# Patient Record
Sex: FEMALE | HISPANIC OR LATINO | URBAN - METROPOLITAN AREA
[De-identification: names, ages, dates, MRNs, and addresses within clinical notes are randomized per-mention and may not be internally consistent; named-entity substitution may affect disease eponyms.]

---

## 2017-01-18 PROBLEM — N39.0 FREQUENT UTI: Status: ACTIVE | Noted: 2017-01-18

## 2017-02-03 ENCOUNTER — APPOINTMENT (RX ONLY)
Dept: URBAN - METROPOLITAN AREA CLINIC 349 | Facility: CLINIC | Age: 42
Setting detail: DERMATOLOGY
End: 2017-02-03

## 2017-02-03 DIAGNOSIS — L29.89 OTHER PRURITUS: ICD-10-CM

## 2017-02-03 DIAGNOSIS — L28.0 LICHEN SIMPLEX CHRONICUS: ICD-10-CM

## 2017-02-03 DIAGNOSIS — L29.8 OTHER PRURITUS: ICD-10-CM

## 2017-02-03 PROBLEM — L20.84 INTRINSIC (ALLERGIC) ECZEMA: Status: ACTIVE | Noted: 2017-02-03

## 2017-02-03 PROBLEM — L70.0 ACNE VULGARIS: Status: ACTIVE | Noted: 2017-02-03

## 2017-02-03 PROCEDURE — 99242 OFF/OP CONSLTJ NEW/EST SF 20: CPT | Mod: 25

## 2017-02-03 PROCEDURE — ? PRESCRIPTION

## 2017-02-03 PROCEDURE — ? COUNSELING

## 2017-02-03 PROCEDURE — 11900 INJECT SKIN LESIONS </W 7: CPT

## 2017-02-03 PROCEDURE — ? INTRALESIONAL KENALOG

## 2017-02-03 RX ORDER — CLOBETASOL PROPIONATE 0.5 MG/G
OINTMENT TOPICAL
Qty: 1 | Refills: 2 | Status: ERX | COMMUNITY
Start: 2017-02-03

## 2017-02-03 RX ADMIN — CLOBETASOL PROPIONATE: 0.5 OINTMENT TOPICAL at 00:00

## 2017-02-03 ASSESSMENT — LOCATION SIMPLE DESCRIPTION DERM: LOCATION SIMPLE: POSTERIOR NECK

## 2017-02-03 ASSESSMENT — LOCATION ZONE DERM: LOCATION ZONE: NECK

## 2017-02-03 ASSESSMENT — SEVERITY ASSESSMENT: SEVERITY: MODERATE

## 2017-02-03 ASSESSMENT — LOCATION DETAILED DESCRIPTION DERM: LOCATION DETAILED: LEFT INFERIOR POSTERIOR NECK

## 2017-02-03 NOTE — PROCEDURE: INTRALESIONAL KENALOG
Kenalog Preparation: Kenalog
Expiration Date (Optional): 04/2018
Concentration Of Solution Injected (Mg/Ml): 10.0
Total Volume Injected (Ccs- Only Use Numbers And Decimals): 0.5
Detail Level: Detailed
Size Of Lesion (Optional): -
Administered By (Optional): Dr. Sarah Mora
Consent: The risks of atrophy were reviewed with the patient.
X Size Of Lesion In Cm (Optional): 0

## 2017-03-22 ENCOUNTER — HOSPITAL ENCOUNTER (OUTPATIENT)
Dept: PHYSICAL THERAPY | Age: 42
Discharge: HOME | End: 2017-03-22
Attending: NURSE PRACTITIONER
Payer: COMMERCIAL

## 2017-03-22 DIAGNOSIS — R32 URINARY INCONTINENCE, UNSPECIFIED TYPE: ICD-10-CM

## 2017-03-22 PROCEDURE — 97162 PT EVAL MOD COMPLEX 30 MIN: CPT

## 2017-03-22 NOTE — PROGRESS NOTES
Bolivar Proctor  : 1975 [facility] at [address], [address] [place] [address],[address], [facility] [address]  [phone]   [phone]          OUTPATIENT PHYSICAL THERAPY:Initial Assessment 3/22/2017    ICD-10: Treatment Diagnosis: Stress Incontinence (N39.3), Myalgia (M79.1)  Precautions/Allergies:   Review of patient's allergies indicates no known allergies. Fall Risk Score: 0 (? 5 = High Risk)  MD Orders: Evaluate and treat MEDICAL/REFERRING DIAGNOSIS:  Urinary incontinence, unspecified type [R32]   DATE OF ONSET: 1 year  REFERRING PHYSICIAN: Liu Rodrigez, Keith Nails, *  RETURN PHYSICIAN APPOINTMENT: TBD     INITIAL ASSESSMENT:  Ms. Franko Agudelo presents with tenderness and increased tone of PFmm. Maintaining this guarded, hypertonic position is likely contributing to her constant pain as well as chronic constipation. Pt demonstrates strength and significant endurance deficits leading to stress incontinence. This might be partly due to the active insufficiency of her PFmm. Pt will benefit from physical therapy to address stated problems. Plan of care was discussed and agreed upon with patient and HEP was initiated. Thank you for the opportunity to work with this patient. PROBLEM LIST (Impacting functional limitations):  1. Decreased Strength  2. Increased Pain  3. Decreased Flexibility/Joint Mobility  4. Decreased strength of pelvic floor which limits bladder control INTERVENTIONS PLANNED:  1. Biofeedback as needed  2. Bladder retraining  3. Bladder education  4. Cold  5. Electrical Stimulation  6. Heat  7. Home Exercise Program (HEP)  8. Manual Therapy  9. Neuromuscular Re-education/Strengthening  10. Therapeutic Activites  11. Therapeutic Exercise/Strengthening  12. Ultrasound (US)   TREATMENT PLAN:  Effective Dates: 3/22/17 TO 17.   Frequency/Duration: 1 time a week for 12 weeks  GOALS: (Goals have been discussed and agreed upon with patient.)  Short-Term Functional Goals: Time Frame: 2 weeks  1. Patient will demonstrate independence with home exercise program.  2. Pt will report increased water intake to 8 glasses/day for improved hydration and bladder health. 3. Pt will report adhering to regular schedule of going to bathroom to urinate Q2hrs for bladder retraining and proper sensory feedback   Discharge Goals: Time Frame: 12 weeks  1. Pt will score 17 on PFIQ-7 for overall functional improvement. 2. Pt will report improvement in pain levels to avg 1/10 to allow for sleeping throughout the night without waking secondary to pain. 3. Pt will report improvement in stress incontinence and 50% less leaks for decreased social anxiety at work. Rehabilitation Potential For Stated Goals: [address] [facility] therapy, I certify that the treatment plan above will be carried out by a therapist or under their direction. Thank you for this referral,  Kamilla Michaud, PT     Referring Physician Signature: Liu Rodrigez, Keith Malave, *              Date                    The information in this section was collected on 03/22/17  (except where otherwise noted). HISTORY:   Present Symptoms:  Pain Intensity 1: 3   History of Present Injury/Illness (Reason for Referral):  Pt reports leaking for years worsening over past year. Reports symptoms of stress incontinence. Reports intermittent pain burning in area of vagina. Pain is constant. avg 3/10 increases to 7/10 at worst.  LBP more with sitting or resting at the end of the day L>R. Pt also reports chronic constipation avg Q3d. Past Medical History/Comorbidities:   Ms. Franko Agudelo  has a past medical history of UTI (urinary tract infection). Ms. Franko Agudelo  has a past surgical history that includes tubal ligation (1996).   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 , Disp: 90 Cap, Rfl: 3    Mth-Me Blue-Sod Phos-PhSal-Hyo (URIBEL) 118-10-40.8-36 mg cap capsule, Take 1 Cap by mouth four (4) times daily. , Disp: 40 Cap, Rfl: 2    silver sulfADIAZINE (SILVADENE) 1 % topical cream, Apply  to affected area daily. , Disp: 50 g, Rfl: 1   Date Last Reviewed:  03/22/17   Gynecological History:   · Number of pregnancies: 2, vaginal 2, C-sections   · Episiotomy: yes with 1st  Past Urinary Medical History:    · History of UTI, Menopause: test positive Q3 mos but have symptoms often; irregular period and periodic hot flashes  · Previous Treatments: none noted  Incontinence History:  PROBLEM: YES/NO: COMMENTS:   Loss of urine with coughing YES    Loss of urine with lifting  YES    Loss of urine with exercise, running NO    Loss of urine with strong urge NO    Loss of urine with approaching the bathroom NO    Loss of urine with key in lock NO    Loss of urine as getting to toilet/removing clothing NO    Loss of urine when hearing running water NO    Have difficulty initiating a urine stream YES    Have difficulty stopping urine stream YES sometimes   Have to strain to empty bladder YES    Dribble urine when urinating YES    Dribble urine after emptying bladder YES    Experience pain with urination YES    Experience burning during urination YES    Have blood in urine NO      Voiding Patterns:  Patient voids 2x/during the day and 0 times during the night. Patient reports that she empties bladder fully rarely. Patient uses pads for bladder protection; she changes pads 1 times per day. Medium saturation  Fluid Intake:  Patient drinks 6 cups of fluid per day. She consumes 2 cups of caffeinated beverages per day. Patient does not limit fluid intake to control bladder.   Bowel Habits:  Patient reports chronic constipation avg Q3d  Mobility / Self Care: independent  Personal / Social History:  · Sexually active? YES:   · Social activities restricted due to urinary incontinence? NO:       Number of Personal Factors/Comorbidities that affect the Plan of Care: 1-2: MODERATE COMPLEXITY   EXAMINATION:   External Observation:   · Voluntary Contraction: present  · Voluntary Relaxation: present  · Involuntary Contraction: absent  · Involuntary Relaxation: absent  · Perineal Body Assessment: WNL  · Anal [place]: not present  · Skin Integrity: WNL  · Q-tip Test: mild tenderness left  · Vaginal Vault Size: within normal limits  · Weak abdominals noted 3+/5 grossly  · Increased thoracic kyphosis and lumbar lordosis in standing    [place] PERFECT (Power/Endurnace/Repetitions/Fast Twitch/Elevation/Co-contraction/Timing) Scale:   · [place] PERFECT = 3/3/5/8//  · Tissue support test with bearing down:  Grade 1: not visible at introitus; Grade 2: visible at introitus; Grade 3: tissue outside introitus  · Anterior Wall = TBD   · Posterior Wall = TBD   · Apical = TBD   · Palpation:   Right Left   Bulbocavernosus  tender   Ischocavernosus  tender   Superficial Transverse Perineal     Sphincter Urethrae     Compressor Urethra      Urethra-vaginalis     Deep Transverse Perineium     Obturator Internus tender Tender with increased tone   Iliococcygeus  tender   Coccygeus  tender   Pubococcygeus  tender   Levator Ani     Adductor     Psoas tender tender         Body Structures Involved:  1. Digestive Structures  2. Joints  3. Muscles  4. Ligaments Body Functions Affected:  1. Mental  2. Sensory/Pain  3. Genitourinary  4. Reproductive  5. Neuromusculoskeletal  6. Movement Related  7. Digestive Activities and Participation Affected:  1. Learning and Applying Knowledge  2. General Tasks and Demands  3. Mobility  4. Self Care  5. Domestic Life  6. Interpersonal Interactions and Relationships  7.  Community, Social and [place]   Number of elements that affect the Plan of Care: 3: MODERATE COMPLEXITY   CLINICAL PRESENTATION:   Presentation: Evolving clinical presentation with changing clinical characteristics: MODERATE COMPLEXITY   CLINICAL DECISION MAKING:   Outcome Measure: Tool Used: Pelvic Floor Impact Questionnaire--short form 7 (PFIQ-7)   Score:  Initial: 22.2%  · Bladder or Urine: 23.81  · Bowel or Rectum: 28.57  · Vagina or Pelvis: 14.29 Most Recent: X (Date: -- )  · Bladder or Urine: X  · Bowel or Rectum: X  · Vagina or Pelvis: X   Interpretation of Score: Each of the 7 sections is scored on a scale from 0-3; 0 representing \"Not at all\", 3 representing \"Quite a bit\". The mean value is taken from all the answered items, then multiplied by 100 to obtain the scale score, ranging from 0-100. This process is repeated for each column representing bowel, bladder, and pelvic pain. ? Self Care:     - CURRENT STATUS: CJ - 20%-39% impaired, limited or restricted    - GOAL STATUS: CI - 1%-19% impaired, limited or restricted    - D/C STATUS:  ---------------To be determined---------------     Medical Necessity:   · Patient demonstrates good rehab potential due to higher previous functional level. Reason for Services/Other Comments:  · Patient continues to require skilled intervention due to ongoing goals noted above. Use of outcome tool(s) and clinical judgement create a POC that gives a: Questionable prediction of patient's progress: MODERATE COMPLEXITY   TREATMENT:   (In addition to Assessment/Re-Assessment sessions the following treatments were rendered)  Pre-treatment Symptoms/Complaints:  States she is experiencing constant discomfort in perineum area; BM avg Q3d. 2ppd  Pain: Initial:   Pain Intensity 1: 3  Post Session:  2     THERAPEUTIC EXERCISE: (  minutes):  Exercises per grid below to improve strength and coordination. Required minimal verbal and tactile cues to promote proper body mechanics and promote proper body breathing techniques.   Progressed resistance and repetitions as indicated. Date:  3/22/17 Date:   Date:     Activity/Exercise Parameters Parameters Parameters   DKC  [id number]     Happy baby stretch [id number]                                        Exercises:  Patient instructed in pelvic floor exercises listed below:  HEP: Dave Galla, happy baby stretch  The following educational topics were reviewed with patient:  Bladder health, tips to control urge, bladder diary, pelvic floor anatomy, how foods affect bladder, bladder retraining. Treatment/Session Assessment:    · Response to Treatment:  Pt reported good understanding of plan of care as well as exercises. All questions were answered and pt was invited to call with any further questions or issues   · Compliance with Program/Exercises: Will assess as treatment progresses. · Recommendations/Intent for next treatment session: \"Next visit will focus on advancements to more challenging activities\".   Total Treatment Duration:  PT Patient Time In/Time Out  Time In: 1330  Time Out: [address] [place]

## 2017-03-22 NOTE — PROGRESS NOTES
Ambulatory/Rehab Services H2 Model Falls Risk Assessment    Risk Factor Pts. ·   Confusion/Disorientation/Impulsivity  []    4 ·   Symptomatic Depression  []   2 ·   Altered Elimination  []   1 ·   Dizziness/Vertigo  []   1 ·   Gender (Male)  []   1 ·   Any administered antiepileptics (anticonvulsants):  []   2 ·   Any administered benzodiazepines:  []   1 ·   Visual Impairment (specify):  []   1 ·   Portable Oxygen Use  []   1 ·   Orthostatic ? BP  []   1 ·   History of Recent Falls (within 3 mos.)  []   5     Ability to Rise from Chair (choose one) Pts. ·   Ability to rise in a single movement  [x]   0 ·   Pushes up, successful in one attempt  []   1 ·   Multiple attempts, but successful  []   3 ·   Unable to rise without assistance  []   4   Total: (5 or greater = High Risk) 0     Falls Prevention Plan:   []                Physical Limitations to Exercise (specify):   []                Mobility Assistance Device (type):   []                Exercise/Equipment Adaptation (specify):    ©2010 [facility] [address] [place] [address] Patent #[id number].  Federal Law prohibits the replication, distribution or use without written permission from [facility]

## 2017-03-28 ENCOUNTER — HOSPITAL ENCOUNTER (OUTPATIENT)
Dept: PHYSICAL THERAPY | Age: 42
Discharge: HOME | End: 2017-03-28
Attending: NURSE PRACTITIONER
Payer: COMMERCIAL

## 2017-03-28 PROCEDURE — 97140 MANUAL THERAPY 1/> REGIONS: CPT

## 2017-03-28 PROCEDURE — 97110 THERAPEUTIC EXERCISES: CPT

## 2017-03-28 NOTE — PROGRESS NOTES
April Nevarez  : 1975 [facility] at [address], [address] [place] [address],[address], [facility] [address].  [phone]   [phone]          OUTPATIENT PHYSICAL THERAPY:Daily Note 3/28/2017    ICD-10: Treatment Diagnosis: Stress Incontinence (N39.3), Myalgia (M79.1)  Precautions/Allergies:   Review of patient's allergies indicates no known allergies. Fall Risk Score: 0 (? 5 = High Risk)  MD Orders: Evaluate and treat MEDICAL/REFERRING DIAGNOSIS:  Unspecified urinary incontinence [R32]   DATE OF ONSET: 1 year  REFERRING PHYSICIAN: Maxi Sheldon, Glenis Noble, *  RETURN PHYSICIAN APPOINTMENT: TBD     INITIAL ASSESSMENT:  Ms. Carolina Garnett presents with tenderness and increased tone of PFmm. Maintaining this guarded, hypertonic position is likely contributing to her constant pain as well as chronic constipation. Pt demonstrates strength and significant endurance deficits leading to stress incontinence. This might be partly due to the active insufficiency of her PFmm. Pt will benefit from physical therapy to address stated problems. Plan of care was discussed and agreed upon with patient and HEP was initiated. Thank you for the opportunity to work with this patient. PROBLEM LIST (Impacting functional limitations):  1. Decreased Strength  2. Increased Pain  3. Decreased Flexibility/Joint Mobility  4. Decreased strength of pelvic floor which limits bladder control INTERVENTIONS PLANNED:  1. Biofeedback as needed  2. Bladder retraining  3. Bladder education  4. Cold  5. Electrical Stimulation  6. Heat  7. Home Exercise Program (HEP)  8. Manual Therapy  9. Neuromuscular Re-education/Strengthening  10. Therapeutic Activites  11. Therapeutic Exercise/Strengthening  12. Ultrasound (US)   TREATMENT PLAN:  Effective Dates: 3/22/17 TO 17.   Frequency/Duration: 1 time a week for 12 weeks  GOALS: (Goals have been discussed and agreed upon with patient.)  Short-Term Functional Goals: Time Frame: 2 weeks  1. Patient will demonstrate independence with home exercise program.  2. Pt will report increased water intake to 8 glasses/day for improved hydration and bladder health. 3. Pt will report adhering to regular schedule of going to bathroom to urinate Q2hrs for bladder retraining and proper sensory feedback   Discharge Goals: Time Frame: 12 weeks  1. Pt will score 17 on PFIQ-7 for overall functional improvement. 2. Pt will report improvement in pain levels to avg 1/10 to allow for sleeping throughout the night without waking secondary to pain. 3. Pt will report improvement in stress incontinence and 50% less leaks for decreased social anxiety at work. Rehabilitation Potential For Stated Goals: [address] [facility] therapy, I certify that the treatment plan above will be carried out by a therapist or under their direction. Thank you for this referral,  Aleshia Mansfield, PT     Referring Physician Signature: Adan Clark, Jenna Jarvis, *              Date                    The information in this section was collected on 03/28/17  (except where otherwise noted). HISTORY:   Present Symptoms:  Pain Intensity 1: (P) 3   History of Present Injury/Illness (Reason for Referral):  Pt reports leaking for years worsening over past year. Reports symptoms of stress incontinence. Reports intermittent pain burning in area of vagina. Pain is constant. avg 3/10 increases to 7/10 at worst.  LBP more with sitting or resting at the end of the day L>R. Pt also reports chronic constipation avg Q3d. Past Medical History/Comorbidities:   Ms. Carmen Ferrell  has a past medical history of UTI (urinary tract infection). Ms. Carmen Ferrell  has a past surgical history that includes tubal ligation (1996).   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 , Disp: 90 Cap, Rfl: 3    Mth-Me Blue-Sod Phos-PhSal-Hyo (URIBEL) 118-10-40.8-36 mg cap capsule, Take 1 Cap by mouth four (4) times daily. , Disp: 40 Cap, Rfl: 2    silver sulfADIAZINE (SILVADENE) 1 % topical cream, Apply  to affected area daily. , Disp: 50 g, Rfl: 1   Date Last Reviewed:  03/28/17   Gynecological History:   · Number of pregnancies: 2, vaginal 2, C-sections   · Episiotomy: yes with 1st  Past Urinary Medical History:    · History of UTI, Menopause: test positive Q3 mos but have symptoms often; irregular period and periodic hot flashes  · Previous Treatments: none noted  Incontinence History:  PROBLEM: YES/NO: COMMENTS:   Loss of urine with coughing YES    Loss of urine with lifting  YES    Loss of urine with exercise, running NO    Loss of urine with strong urge NO    Loss of urine with approaching the bathroom NO    Loss of urine with key in lock NO    Loss of urine as getting to toilet/removing clothing NO    Loss of urine when hearing running water NO    Have difficulty initiating a urine stream YES    Have difficulty stopping urine stream YES sometimes   Have to strain to empty bladder YES    Dribble urine when urinating YES    Dribble urine after emptying bladder YES    Experience pain with urination YES    Experience burning during urination YES    Have blood in urine NO      Voiding Patterns:  Patient voids 2x/during the day and 0 times during the night. Patient reports that she empties bladder fully rarely. Patient uses pads for bladder protection; she changes pads 1 times per day. Medium saturation  Fluid Intake:  Patient drinks 6 cups of fluid per day. She consumes 2 cups of caffeinated beverages per day. Patient does not limit fluid intake to control bladder.   Bowel Habits:  Patient reports chronic constipation avg Q3d  Mobility / Self Care: independent  Personal / Social History:  · Sexually active? YES:   · Social activities restricted due to urinary incontinence? NO:       Number of Personal Factors/Comorbidities that affect the Plan of Care: 1-2: MODERATE COMPLEXITY   EXAMINATION:   External Observation:   · Voluntary Contraction: present  · Voluntary Relaxation: present  · Involuntary Contraction: absent  · Involuntary Relaxation: absent  · Perineal Body Assessment: WNL  · Anal Vernon: not present  · Skin Integrity: WNL  · Q-tip Test: mild tenderness left  · Vaginal Vault Size: within normal limits  · Weak abdominals noted 3+/5 grossly  · Increased thoracic kyphosis and lumbar lordosis in standing    [place] PERFECT (Power/Endurnace/Repetitions/Fast Twitch/Elevation/Co-contraction/Timing) Scale:   · [place] PERFECT = 3/3/5/8//  · Tissue support test with bearing down:  Grade 1: not visible at introitus; Grade 2: visible at introitus; Grade 3: tissue outside introitus  · Anterior Wall = TBD   · Posterior Wall = TBD   · Apical = TBD   · Palpation:   Right Left   Bulbocavernosus  tender   Ischocavernosus  tender   Superficial Transverse Perineal     Sphincter Urethrae     Compressor Urethra      Urethra-vaginalis     Deep Transverse Perineium     Obturator Internus tender Tender with increased tone   Iliococcygeus  tender   Coccygeus  tender   Pubococcygeus  tender   Levator Ani     Adductor     Psoas tender tender      SEMG evaluation:  Date: 3/28/17  Resting Tone: 7uV  Quality of Resting Tone: irregular  5 Second Hold: 20 uV  10 Second Hold: 15 uV  Quality of Recruitment: Good   Quality of Relaxation: Fair   Quality of Holding: Fair   Stability of Hold: Fair   Stability of Rest: Fair          Body Structures Involved:  1. Digestive Structures  2. Joints  3. Muscles  4. Ligaments Body Functions Affected:  1. Mental  2. Sensory/Pain  3. Genitourinary  4. Reproductive  5. Neuromusculoskeletal  6. Movement Related  7.  Digestive Activities and Participation Affected:  1. Learning and Applying Knowledge  2. General Tasks and Demands  3. Mobility  4. Self Care  5. Domestic Life  6. Interpersonal Interactions and Relationships  7. Community, Social and [place]   Number of elements that affect the Plan of Care: 3: MODERATE COMPLEXITY   CLINICAL PRESENTATION:   Presentation: Evolving clinical presentation with changing clinical characteristics: MODERATE COMPLEXITY   CLINICAL DECISION MAKING:   Outcome Measure: Tool Used: Pelvic Floor Impact Questionnaire--short form 7 (PFIQ-7)   Score:  Initial: 22.2%  · Bladder or Urine: 23.81  · Bowel or Rectum: 28.57  · Vagina or Pelvis: 14.29 Most Recent: X (Date: -- )  · Bladder or Urine: X  · Bowel or Rectum: X  · Vagina or Pelvis: X   Interpretation of Score: Each of the 7 sections is scored on a scale from 0-3; 0 representing \"Not at all\", 3 representing \"Quite a bit\". The mean value is taken from all the answered items, then multiplied by 100 to obtain the scale score, ranging from 0-100. This process is repeated for each column representing bowel, bladder, and pelvic pain. ? Self Care:     - CURRENT STATUS: CJ - 20%-39% impaired, limited or restricted    - GOAL STATUS: CI - 1%-19% impaired, limited or restricted    - D/C STATUS:  ---------------To be determined---------------     Medical Necessity:   · Patient demonstrates good rehab potential due to higher previous functional level. Reason for Services/Other Comments:  · Patient continues to require skilled intervention due to ongoing goals noted above. Use of outcome tool(s) and clinical judgement create a POC that gives a: Questionable prediction of patient's progress: MODERATE COMPLEXITY   TREATMENT:   (In addition to Assessment/Re-Assessment sessions the following treatments were rendered)  Pre-treatment Symptoms/Complaints:  Reports 3/10 pain today.   BM yesterday and states she has been having one about Q2d this week (improvement from Q3d last week).  States she is trying to drink more water. Points to pain today at left piriformis traveling FCI down posterior thigh. Pain: Initial:   Pain Intensity 1: (P) 3  Post Session:  2     THERAPEUTIC EXERCISE: ( 30 minutes):  Exercises per grid below to improve strength and coordination. Required minimal verbal and tactile cues to promote proper body mechanics and promote proper body breathing techniques. Progressed resistance and repetitions as indicated. Date:  3/22/17 Date:  3/28/17 Date:     Activity/Exercise Parameters Parameters Parameters   DKC  [id number]    Happy baby stretch [id number]    Piriformis stretch  [id number]    PF drops with biofeedback  10'    Diaphragmatic breathing/relaxation with biofeedback  10'                     Exercises:  Patient instructed in pelvic floor exercises listed below:  HEP: [address], happy baby stretch  3/28/17: Added piriformis stretch, abdominal massage for peristalsis, diaphragmatic breathing for HEP  Manual Therapy: (30'): performed for relaxation of muscles to relieve guarding and pain  SCS and TPR to OI, LA internally, SCS to piriformis externally  The following educational topics were reviewed with patient:  Bladder health, tips to control urge, bladder diary, pelvic floor anatomy, how foods affect bladder, bladder retraining. Treatment/Session Assessment:    · Response to Treatment:  Pt tolerated all treatment well. Voiced understanding of HEP. Discussed getting routine for morning - hot beverage, abdominal massage to trigger gastrocolic reflex. Diaphragmatic breathing for relaxation   · Compliance with Program/Exercises: Will assess as treatment progresses. · Recommendations/Intent for next treatment session: \"Next visit will focus on advancements to more challenging activities\".   Total Treatment Duration:  PT Patient Time In/Time Out  Time In: (P) 1500  Time Out: (P) 239 [employer] Ciera Garcia, PT

## 2017-04-04 ENCOUNTER — HOSPITAL ENCOUNTER (OUTPATIENT)
Dept: PHYSICAL THERAPY | Age: 42
Discharge: HOME | End: 2017-04-04
Attending: NURSE PRACTITIONER
Payer: COMMERCIAL

## 2017-04-04 NOTE — PROGRESS NOTES
[facility] [place] [address]  [address] [place] [address], [address] [place] [address],[address] [place], [address]  Phone: [phone]   Fax: [phone]    Pt cancelled today's physical therapy appointment due to a family emergency. Plan to follow up at next scheduled visit.     Beatriz Robert, MPT

## 2017-04-07 ENCOUNTER — APPOINTMENT (OUTPATIENT)
Dept: PHYSICAL THERAPY | Age: 42
End: 2017-04-07
Attending: NURSE PRACTITIONER
Payer: COMMERCIAL

## 2017-04-18 ENCOUNTER — HOSPITAL ENCOUNTER (OUTPATIENT)
Dept: PHYSICAL THERAPY | Age: 42
Discharge: HOME | End: 2017-04-18
Attending: NURSE PRACTITIONER
Payer: COMMERCIAL

## 2017-04-18 NOTE — PROGRESS NOTES
[facility] [place] [address]  [address] [facility], [address] [place] [address], [address]  Phone: [phone]   Fax: [phone]    Pt cancelled today's physical therapy appointment. Plan to follow up at next scheduled visit.     Denis Savage, [facility]

## 2017-04-21 ENCOUNTER — APPOINTMENT (OUTPATIENT)
Dept: PHYSICAL THERAPY | Age: 42
End: 2017-04-21
Attending: NURSE PRACTITIONER
Payer: COMMERCIAL

## 2017-04-28 ENCOUNTER — APPOINTMENT (OUTPATIENT)
Dept: PHYSICAL THERAPY | Age: 42
End: 2017-04-28
Attending: NURSE PRACTITIONER
Payer: COMMERCIAL

## 2017-04-28 ENCOUNTER — HOSPITAL ENCOUNTER (OUTPATIENT)
Dept: PHYSICAL THERAPY | Age: 42
Discharge: HOME | End: 2017-04-28
Attending: NURSE PRACTITIONER
Payer: COMMERCIAL

## 2017-04-28 PROCEDURE — 97140 MANUAL THERAPY 1/> REGIONS: CPT

## 2017-04-28 PROCEDURE — 97110 THERAPEUTIC EXERCISES: CPT

## 2017-04-28 NOTE — PROGRESS NOTES
Nj Cowan  : 1975 [facility] at [address], [address] [place] [address],[address], [facility].  [phone]   [phone]          OUTPATIENT PHYSICAL THERAPY:Daily Note 2017    ICD-10: Treatment Diagnosis: Stress Incontinence (N39.3), Myalgia (M79.1)  Precautions/Allergies:   Review of patient's allergies indicates no known allergies. Fall Risk Score: 0 (? 5 = High Risk)  MD Orders: Evaluate and treat MEDICAL/REFERRING DIAGNOSIS:  Unspecified urinary incontinence [R32]   DATE OF ONSET: 1 year  REFERRING PHYSICIAN: Ricarda Castaneda, Nikita Le, *  RETURN PHYSICIAN APPOINTMENT: TBD     INITIAL ASSESSMENT:  Ms. Priyanka Desai presents with tenderness and increased tone of PFmm. Maintaining this guarded, hypertonic position is likely contributing to her constant pain as well as chronic constipation. Pt demonstrates strength and significant endurance deficits leading to stress incontinence. This might be partly due to the active insufficiency of her PFmm. Pt will benefit from physical therapy to address stated problems. Plan of care was discussed and agreed upon with patient and HEP was initiated. Thank you for the opportunity to work with this patient. PROBLEM LIST (Impacting functional limitations):  1. Decreased Strength  2. Increased Pain  3. Decreased Flexibility/Joint Mobility  4. Decreased strength of pelvic floor which limits bladder control INTERVENTIONS PLANNED:  1. Biofeedback as needed  2. Bladder retraining  3. Bladder education  4. Cold  5. Electrical Stimulation  6. Heat  7. Home Exercise Program (HEP)  8. Manual Therapy  9. Neuromuscular Re-education/Strengthening  10. Therapeutic Activites  11. Therapeutic Exercise/Strengthening  12. Ultrasound (US)   TREATMENT PLAN:  Effective Dates: 3/22/17 TO 17.   Frequency/Duration: 1 time a week for 12 weeks  GOALS: (Goals have been discussed and agreed upon with patient.)  Short-Term Functional Goals: Time Frame: 2 weeks  1. Patient will demonstrate independence with home exercise program.  2. Pt will report increased water intake to 8 glasses/day for improved hydration and bladder health. 3. Pt will report adhering to regular schedule of going to bathroom to urinate Q2hrs for bladder retraining and proper sensory feedback   Discharge Goals: Time Frame: 12 weeks  1. Pt will score 17 on PFIQ-7 for overall functional improvement. 2. Pt will report improvement in pain levels to avg 1/10 to allow for sleeping throughout the night without waking secondary to pain. 3. Pt will report improvement in stress incontinence and 50% less leaks for decreased social anxiety at work. Rehabilitation Potential For Stated Goals: [address] [facility] therapy, I certify that the treatment plan above will be carried out by a therapist or under their direction. Thank you for this referral,  Sherman Yoon, PT     Referring Physician Signature: Sascha Araujo, *              Date                    The information in this section was collected on 04/28/17  (except where otherwise noted). HISTORY:   Present Symptoms:  Pain Intensity 1: 6   History of Present Injury/Illness (Reason for Referral):  Pt reports leaking for years worsening over past year. Reports symptoms of stress incontinence. Reports intermittent pain burning in area of vagina. Pain is constant. avg 3/10 increases to 7/10 at worst.  LBP more with sitting or resting at the end of the day L>R. Pt also reports chronic constipation avg Q3d. Past Medical History/Comorbidities:   Ms. Ramiro Pace  has a past medical history of UTI (urinary tract infection). Ms. Ramiro Pace  has a past surgical history that includes tubal ligation (1996).   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 , Disp: 90 Cap, Rfl: 3    Mth-Me Blue-Sod Phos-PhSal-Hyo (URIBEL) 118-10-40.8-36 mg cap capsule, Take 1 Cap by mouth four (4) times daily. , Disp: 40 Cap, Rfl: 2    silver sulfADIAZINE (SILVADENE) 1 % topical cream, Apply  to affected area daily. , Disp: 50 g, Rfl: 1   Date Last Reviewed:  04/28/17   Gynecological History:   · Number of pregnancies: 2, vaginal 2, C-sections   · Episiotomy: yes with 1st  Past Urinary Medical History:    · History of UTI, Menopause: test positive Q3 mos but have symptoms often; irregular period and periodic hot flashes  · Previous Treatments: none noted  Incontinence History:  PROBLEM: YES/NO: COMMENTS:   Loss of urine with coughing YES    Loss of urine with lifting  YES    Loss of urine with exercise, running NO    Loss of urine with strong urge NO    Loss of urine with approaching the bathroom NO    Loss of urine with key in lock NO    Loss of urine as getting to toilet/removing clothing NO    Loss of urine when hearing running water NO    Have difficulty initiating a urine stream YES    Have difficulty stopping urine stream YES sometimes   Have to strain to empty bladder YES    Dribble urine when urinating YES    Dribble urine after emptying bladder YES    Experience pain with urination YES    Experience burning during urination YES    Have blood in urine NO      Voiding Patterns:  Patient voids 2x/during the day and 0 times during the night. Patient reports that she empties bladder fully rarely. Patient uses pads for bladder protection; she changes pads 1 times per day. Medium saturation  Fluid Intake:  Patient drinks 6 cups of fluid per day. She consumes 2 cups of caffeinated beverages per day. Patient does not limit fluid intake to control bladder.   Bowel Habits:  Patient reports chronic constipation avg Q3d  Mobility / Self Care: independent  Personal / Social History:  · Sexually active? YES:   · Social activities restricted due to urinary incontinence? NO:       Number of Personal Factors/Comorbidities that affect the Plan of Care: 1-2: MODERATE COMPLEXITY   EXAMINATION:   External Observation:   · Voluntary Contraction: present  · Voluntary Relaxation: present  · Involuntary Contraction: absent  · Involuntary Relaxation: absent  · Perineal Body Assessment: WNL  · Anal [place]: not present  · Skin Integrity: WNL  · Q-tip Test: mild tenderness left  · Vaginal Vault Size: within normal limits  · Weak abdominals noted 3+/5 grossly  · Increased thoracic kyphosis and lumbar lordosis in standing    [place] PERFECT (Power/Endurnace/Repetitions/Fast Twitch/Elevation/Co-contraction/Timing) Scale:   · [place] PERFECT = 3/3/5/8//  · Tissue support test with bearing down:  Grade 1: not visible at introitus; Grade 2: visible at introitus; Grade 3: tissue outside introitus  · Anterior Wall = TBD   · Posterior Wall = TBD   · Apical = TBD   · Palpation:   Right Left   Bulbocavernosus  tender   Ischocavernosus  tender   Superficial Transverse Perineal     Sphincter Urethrae     Compressor Urethra      Urethra-vaginalis     Deep Transverse Perineium     Obturator Internus tender Tender with increased tone   Iliococcygeus  tender   Coccygeus  tender   Pubococcygeus  tender   Levator Ani     Adductor     Psoas tender tender      SEMG evaluation:  Date: 3/28/17  Resting Tone: 7uV  Quality of Resting Tone: irregular  5 Second Hold: 20 uV  10 Second Hold: 15 uV  Quality of Recruitment: Good   Quality of Relaxation: Fair   Quality of Holding: Fair   Stability of Hold: Fair   Stability of Rest: Fair          Body Structures Involved:  1. Digestive Structures  2. Joints  3. Muscles  4. Ligaments Body Functions Affected:  1. Mental  2. Sensory/Pain  3. Genitourinary  4. Reproductive  5. Neuromusculoskeletal  6. Movement Related  7.  Digestive Activities and Participation Affected:  1. Learning and Applying Knowledge  2. General Tasks and Demands  3. Mobility  4. Self Care  5. Domestic Life  6. Interpersonal Interactions and Relationships  7. Community, Social and [place]   Number of elements that affect the Plan of Care: 3: MODERATE COMPLEXITY   CLINICAL PRESENTATION:   Presentation: Evolving clinical presentation with changing clinical characteristics: MODERATE COMPLEXITY   CLINICAL DECISION MAKING:   Outcome Measure: Tool Used: Pelvic Floor Impact Questionnaire--short form 7 (PFIQ-7)   Score:  Initial: 22.2%  · Bladder or Urine: 23.81  · Bowel or Rectum: 28.57  · Vagina or Pelvis: 14.29 Most Recent: X (Date: -- )  · Bladder or Urine: X  · Bowel or Rectum: X  · Vagina or Pelvis: X   Interpretation of Score: Each of the 7 sections is scored on a scale from 0-3; 0 representing \"Not at all\", 3 representing \"Quite a bit\". The mean value is taken from all the answered items, then multiplied by 100 to obtain the scale score, ranging from 0-100. This process is repeated for each column representing bowel, bladder, and pelvic pain. ? Self Care:     - CURRENT STATUS: CJ - 20%-39% impaired, limited or restricted    - GOAL STATUS: CI - 1%-19% impaired, limited or restricted    - D/C STATUS:  ---------------To be determined---------------     Medical Necessity:   · Patient demonstrates good rehab potential due to higher previous functional level. Reason for Services/Other Comments:  · Patient continues to require skilled intervention due to ongoing goals noted above. Use of outcome tool(s) and clinical judgement create a POC that gives a: Questionable prediction of patient's progress: MODERATE COMPLEXITY   TREATMENT:   (In addition to Assessment/Re-Assessment sessions the following treatments were rendered)  Pre-treatment Symptoms/Complaints:    Pt reports having a lot of left hip lately. BM Q 2-3 days. Urinating more and feeling more urge.   Hip pain worse with sitting. Reports radicular symptoms posterior LLE extending to foot and reports as 'burning'. 6/10 pain currently    Pain: Initial:   Pain Intensity 1: 6  Post Session:  2     THERAPEUTIC EXERCISE: ( 30 minutes):  Exercises per grid below to improve strength and coordination. Required minimal verbal and tactile cues to promote proper body mechanics and promote proper body breathing techniques. Progressed resistance and repetitions as indicated. 4/28/17 - included further assessment of left hip pain    Date:  3/22/17 Date:  3/28/17 Date:  4/28/17   Activity/Exercise Parameters Parameters Parameters   DKC  [id number]    Happy baby stretch [id number] 2x 45sec   Piriformis stretch  [id number]   PF drops with biofeedback  10'    Diaphragmatic breathing/relaxation with biofeedback  10'    Prone press ups   2x10            Exercises:  Patient instructed in pelvic floor exercises listed below:  HEP: [address], happy baby stretch  3/28/17: Added piriformis stretch, abdominal massage for peristalsis, diaphragmatic breathing for HEP  Manual Therapy: (30'): performed for relaxation of muscles to relieve guarding and pain  SCS and TPR to OI, LA internally, -held  SCS and STM to piriformis externally  Manual axial distraction  The following educational topics were reviewed with patient:  Bladder health, tips to control urge, bladder diary, pelvic floor anatomy, how foods affect bladder, bladder retraining. Treatment/Session Assessment:    · Response to Treatment:  Pt presents with symptoms consistent with L4-5 disc pathology. Symptoms decreased in intensity as well as centralized with repeated extension in lying. no change or exacerbation in symptoms with resisted left hip abd or ER. Issued prone pressups for home and discussed limiting sitting and no slouching. Stressed importance of posture. Assess prolonged results. · Compliance with Program/Exercises:  Will assess as treatment progresses. · Recommendations/Intent for next treatment session: \"Next visit will focus on advancements to more challenging activities\".   Total Treatment Duration:  PT Patient Time In/Time Out  Time In: 1330  Time Out: [address]

## 2017-05-05 ENCOUNTER — HOSPITAL ENCOUNTER (OUTPATIENT)
Dept: PHYSICAL THERAPY | Age: 42
End: 2017-05-05
Attending: NURSE PRACTITIONER
Payer: COMMERCIAL

## 2017-05-05 ENCOUNTER — APPOINTMENT (OUTPATIENT)
Dept: PHYSICAL THERAPY | Age: 42
End: 2017-05-05
Attending: NURSE PRACTITIONER
Payer: COMMERCIAL

## 2017-05-12 ENCOUNTER — HOSPITAL ENCOUNTER (OUTPATIENT)
Dept: PHYSICAL THERAPY | Age: 42
Discharge: HOME | End: 2017-05-12
Attending: NURSE PRACTITIONER
Payer: COMMERCIAL

## 2017-05-12 ENCOUNTER — APPOINTMENT (OUTPATIENT)
Dept: PHYSICAL THERAPY | Age: 42
End: 2017-05-12
Attending: NURSE PRACTITIONER
Payer: COMMERCIAL

## 2017-05-12 PROCEDURE — 97110 THERAPEUTIC EXERCISES: CPT

## 2017-05-12 PROCEDURE — 97140 MANUAL THERAPY 1/> REGIONS: CPT

## 2017-05-12 NOTE — PROGRESS NOTES
Selma Drake  : 1975 [facility] [address], [address] [place] [address],[address]  [phone]   [phone]          OUTPATIENT PHYSICAL THERAPY:Daily Note 2017    ICD-10: Treatment Diagnosis: Stress Incontinence (N39.3), Myalgia (M79.1)  Precautions/Allergies:   Review of patient's allergies indicates no known allergies. Fall Risk Score: 0 (? 5 = High Risk)  MD Orders: Evaluate and treat MEDICAL/REFERRING DIAGNOSIS:  Unspecified urinary incontinence [R32]   DATE OF ONSET: 1 year  REFERRING PHYSICIAN: Maurilio Contreras, Leslye De Leon, *  RETURN PHYSICIAN APPOINTMENT: TBD     INITIAL ASSESSMENT:  Ms. Dominique Linton presents with tenderness and increased tone of PFmm. Maintaining this guarded, hypertonic position is likely contributing to her constant pain as well as chronic constipation. Pt demonstrates strength and significant endurance deficits leading to stress incontinence. This might be partly due to the active insufficiency of her PFmm. Pt will benefit from physical therapy to address stated problems. Plan of care was discussed and agreed upon with patient and HEP was initiated. Thank you for the opportunity to work with this patient. PROBLEM LIST (Impacting functional limitations):  1. Decreased Strength  2. Increased Pain  3. Decreased Flexibility/Joint Mobility  4. Decreased strength of pelvic floor which limits bladder control INTERVENTIONS PLANNED:  1. Biofeedback as needed  2. Bladder retraining  3. Bladder education  4. Cold  5. Electrical Stimulation  6. Heat  7. Home Exercise Program (HEP)  8. Manual Therapy  9. Neuromuscular Re-education/Strengthening  10. Therapeutic Activites  11. Therapeutic Exercise/Strengthening  12. Ultrasound (US)   TREATMENT PLAN:  Effective Dates: 3/22/17 TO 17.   Frequency/Duration: 1 time a week for 12 weeks  GOALS: (Goals have been discussed and agreed upon with patient.)  Short-Term Functional Goals: Time Frame: 2 weeks  1. Patient will demonstrate independence with home exercise program.  2. Pt will report increased water intake to 8 glasses/day for improved hydration and bladder health. 3. Pt will report adhering to regular schedule of going to bathroom to urinate Q2hrs for bladder retraining and proper sensory feedback   Discharge Goals: Time Frame: 12 weeks  1. Pt will score 17 on PFIQ-7 for overall functional improvement. 2. Pt will report improvement in pain levels to avg 1/10 to allow for sleeping throughout the night without waking secondary to pain. 3. Pt will report improvement in stress incontinence and 50% less leaks for decreased social anxiety at work. Rehabilitation Potential For Stated Goals: [address] [facility] therapy, I certify that the treatment plan above will be carried out by a therapist or under their direction. Thank you for this referral,  Arun Sandhu, PT     Referring Physician Signature: Pop Li, *              Date                    The information in this section was collected on 05/12/17  (except where otherwise noted). HISTORY:   Present Symptoms:  Pain Intensity 1: (P) 2   History of Present Injury/Illness (Reason for Referral):  Pt reports leaking for years worsening over past year. Reports symptoms of stress incontinence. Reports intermittent pain burning in area of vagina. Pain is constant. avg 3/10 increases to 7/10 at worst.  LBP more with sitting or resting at the end of the day L>R. Pt also reports chronic constipation avg Q3d. Past Medical History/Comorbidities:   Ms. Italia Murphy  has a past medical history of UTI (urinary tract infection). Ms. Italia Murphy  has a past surgical history that includes tubal ligation (1996).   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 , Disp: 90 Cap, Rfl: 3    Mth-Me Blue-Sod Phos-PhSal-Hyo (URIBEL) 118-10-40.8-36 mg cap capsule, Take 1 Cap by mouth four (4) times daily. , Disp: 40 Cap, Rfl: 2    silver sulfADIAZINE (SILVADENE) 1 % topical cream, Apply  to affected area daily. , Disp: 50 g, Rfl: 1   Date Last Reviewed:  05/12/17   Gynecological History:   · Number of pregnancies: 2, vaginal 2, C-sections   · Episiotomy: yes with 1st  Past Urinary Medical History:    · History of UTI, Menopause: test positive Q3 mos but have symptoms often; irregular period and periodic hot flashes  · Previous Treatments: none noted  Incontinence History:  PROBLEM: YES/NO: COMMENTS:   Loss of urine with coughing YES    Loss of urine with lifting  YES    Loss of urine with exercise, running NO    Loss of urine with strong urge NO    Loss of urine with approaching the bathroom NO    Loss of urine with key in lock NO    Loss of urine as getting to toilet/removing clothing NO    Loss of urine when hearing running water NO    Have difficulty initiating a urine stream YES    Have difficulty stopping urine stream YES sometimes   Have to strain to empty bladder YES    Dribble urine when urinating YES    Dribble urine after emptying bladder YES    Experience pain with urination YES    Experience burning during urination YES    Have blood in urine NO      Voiding Patterns:  Patient voids 2x/during the day and 0 times during the night. Patient reports that she empties bladder fully rarely. Patient uses pads for bladder protection; she changes pads 1 times per day. Medium saturation  Fluid Intake:  Patient drinks 6 cups of fluid per day. She consumes 2 cups of caffeinated beverages per day. Patient does not limit fluid intake to control bladder.   Bowel Habits:  Patient reports chronic constipation avg Q3d  Mobility / Self Care: independent  Personal / Social History:  · Sexually active? YES:   · Social activities restricted due to urinary incontinence? NO:       Number of Personal Factors/Comorbidities that affect the Plan of Care: 1-2: MODERATE COMPLEXITY   EXAMINATION:   External Observation:   · Voluntary Contraction: present  · Voluntary Relaxation: present  · Involuntary Contraction: absent  · Involuntary Relaxation: absent  · Perineal Body Assessment: WNL  · Anal Hatch: not present  · Skin Integrity: WNL  · Q-tip Test: mild tenderness left  · Vaginal Vault Size: within normal limits  · Weak abdominals noted 3+/5 grossly  · Increased thoracic kyphosis and lumbar lordosis in standing    [place] PERFECT (Power/Endurnace/Repetitions/Fast Twitch/Elevation/Co-contraction/Timing) Scale:   · [place] PERFECT = 3/3/5/8//  · Tissue support test with bearing down:  Grade 1: not visible at introitus; Grade 2: visible at introitus; Grade 3: tissue outside introitus  · Anterior Wall = TBD   · Posterior Wall = TBD   · Apical = TBD   · Palpation:   Right Left   Bulbocavernosus  tender   Ischocavernosus  tender   Superficial Transverse Perineal     Sphincter Urethrae     Compressor Urethra      Urethra-vaginalis     Deep Transverse Perineium     Obturator Internus tender Tender with increased tone   Iliococcygeus  tender   Coccygeus  tender   Pubococcygeus  tender   Levator Ani     Adductor     Psoas tender tender      SEMG evaluation:  Date: 3/28/17  Resting Tone: 7uV  Quality of Resting Tone: irregular  5 Second Hold: 20 uV  10 Second Hold: 15 uV  Quality of Recruitment: Good   Quality of Relaxation: Fair   Quality of Holding: Fair   Stability of Hold: Fair   Stability of Rest: Fair          Body Structures Involved:  1. Digestive Structures  2. Joints  3. Muscles  4. Ligaments Body Functions Affected:  1. Mental  2. Sensory/Pain  3. Genitourinary  4. Reproductive  5. Neuromusculoskeletal  6. Movement Related  7.  Digestive Activities and Participation Affected:  1. Learning and Applying Knowledge  2. General Tasks and Demands  3. Mobility  4. Self Care  5. Domestic Life  6. Interpersonal Interactions and Relationships  7. Community, Social and [place]   Number of elements that affect the Plan of Care: 3: MODERATE COMPLEXITY   CLINICAL PRESENTATION:   Presentation: Evolving clinical presentation with changing clinical characteristics: MODERATE COMPLEXITY   CLINICAL DECISION MAKING:   Outcome Measure: Tool Used: Pelvic Floor Impact Questionnaire--short form 7 (PFIQ-7)   Score:  Initial: 22.2%  · Bladder or Urine: 23.81  · Bowel or Rectum: 28.57  · Vagina or Pelvis: 14.29 Most Recent: X (Date: -- )  · Bladder or Urine: X  · Bowel or Rectum: X  · Vagina or Pelvis: X   Interpretation of Score: Each of the 7 sections is scored on a scale from 0-3; 0 representing \"Not at all\", 3 representing \"Quite a bit\". The mean value is taken from all the answered items, then multiplied by 100 to obtain the scale score, ranging from 0-100. This process is repeated for each column representing bowel, bladder, and pelvic pain. ? Self Care:     - CURRENT STATUS: CJ - 20%-39% impaired, limited or restricted    - GOAL STATUS: CI - 1%-19% impaired, limited or restricted    - D/C STATUS:  ---------------To be determined---------------     Medical Necessity:   · Patient demonstrates good rehab potential due to higher previous functional level. Reason for Services/Other Comments:  · Patient continues to require skilled intervention due to ongoing goals noted above. Use of outcome tool(s) and clinical judgement create a POC that gives a: Questionable prediction of patient's progress: MODERATE COMPLEXITY   TREATMENT:   (In addition to Assessment/Re-Assessment sessions the following treatments were rendered)  Pre-treatment Symptoms/Complaints:    Pt states back is feeling better and doing ex's.  She states she has had a very busy week and not paying attention to her urges to urinate which eventually go away. She also reports increased leaking along with this. BM has improved. Going about every other day  Pain: Initial:   Pain Intensity 1: (P) 2  Post Session:  2     THERAPEUTIC EXERCISE: ( 30 minutes):  Exercises per grid below to improve strength and coordination. Required minimal verbal and tactile cues to promote proper body mechanics and promote proper body breathing techniques. Progressed resistance and repetitions as indicated. 4/28/17 - included further assessment of left hip pain    Date:  3/22/17 Date:  3/28/17 Date:  4/28/17 Date:  5/12/17   Activity/Exercise Parameters Parameters Parameters    DKC  [id number]     Happy baby stretch [id number] 2x 45sec    Piriformis stretch  [id number]    PF drops with biofeedback  10'     Diaphragmatic breathing/relaxation with biofeedback  10'  With drops   10'   Prone press ups   2x10            biofeedback performed today to assess resting tone; relaxation techniques including diaphragmatic breathing and PFmm drops; Also initiated 10/10 with good response to resting tone dropping from between 4-5uV initially to 2-3uV. Exercises:  Patient instructed in pelvic floor exercises listed below:  HEP: DKC, happy baby stretch  3/28/17: Added piriformis stretch, abdominal massage for peristalsis, diaphragmatic breathing for HEP  Manual Therapy: (30'): performed for relaxation of muscles to relieve guarding and pain  SCS and TPR to OI, LA internally, -held  SCS and STM to piriformis externally  Manual axial distraction  The following educational topics were reviewed with patient:  Bladder health, tips to control urge, bladder diary, pelvic floor anatomy, how foods affect bladder, bladder retraining. Treatment/Session Assessment:    · Response to Treatment:  Good response to treatment. Issued 10/10 kegel ex for HEP, discussed paying close attention to urge and going immediately to urinate when feel this.   discussed stopping kegel if feel increase in leaking or discomfort. Also talked about use of magnesium, as long as it is ok with her MD, for gentle assist in constipation. · Compliance with Program/Exercises: Will assess as treatment progresses. · Recommendations/Intent for next treatment session: \"Next visit will focus on advancements to more challenging activities\".   Total Treatment Duration:  PT Patient Time In/Time Out  Time In: (P) 1400  Time Out: (P) 1501 Ruddy Ulrich, PT

## 2017-05-19 ENCOUNTER — HOSPITAL ENCOUNTER (OUTPATIENT)
Dept: PHYSICAL THERAPY | Age: 42
Discharge: HOME | End: 2017-05-19
Attending: NURSE PRACTITIONER
Payer: COMMERCIAL

## 2017-05-19 PROCEDURE — 97140 MANUAL THERAPY 1/> REGIONS: CPT

## 2017-05-19 PROCEDURE — 97110 THERAPEUTIC EXERCISES: CPT

## 2017-05-19 NOTE — PROGRESS NOTES
Ada Day  : 1975 [facility] at [address], [address] [place] [address],[address], [facility].  [phone]   [phone]          OUTPATIENT PHYSICAL THERAPY:Daily Note 2017    ICD-10: Treatment Diagnosis: Stress Incontinence (N39.3), Myalgia (M79.1)  Precautions/Allergies:   Review of patient's allergies indicates no known allergies. Fall Risk Score: 0 (? 5 = High Risk)  MD Orders: Evaluate and treat MEDICAL/REFERRING DIAGNOSIS:  Unspecified urinary incontinence [R32]   DATE OF ONSET: 1 year  REFERRING PHYSICIAN: Reema Corey, Mp Lugo, *  RETURN PHYSICIAN APPOINTMENT: TBD     INITIAL ASSESSMENT:  Ms. Karely Lozada presents with tenderness and increased tone of PFmm. Maintaining this guarded, hypertonic position is likely contributing to her constant pain as well as chronic constipation. Pt demonstrates strength and significant endurance deficits leading to stress incontinence. This might be partly due to the active insufficiency of her PFmm. Pt will benefit from physical therapy to address stated problems. Plan of care was discussed and agreed upon with patient and HEP was initiated. Thank you for the opportunity to work with this patient. PROBLEM LIST (Impacting functional limitations):  1. Decreased Strength  2. Increased Pain  3. Decreased Flexibility/Joint Mobility  4. Decreased strength of pelvic floor which limits bladder control INTERVENTIONS PLANNED:  1. Biofeedback as needed  2. Bladder retraining  3. Bladder education  4. Cold  5. Electrical Stimulation  6. Heat  7. Home Exercise Program (HEP)  8. Manual Therapy  9. Neuromuscular Re-education/Strengthening  10. Therapeutic Activites  11. Therapeutic Exercise/Strengthening  12. Ultrasound (US)   TREATMENT PLAN:  Effective Dates: 3/22/17 TO 17.   Frequency/Duration: 1 time a week for 12 weeks  GOALS: (Goals have been discussed and agreed upon with patient.)  Short-Term Functional Goals: Time Frame: 2 weeks  1. Patient will demonstrate independence with home exercise program.  2. Pt will report increased water intake to 8 glasses/day for improved hydration and bladder health. 3. Pt will report adhering to regular schedule of going to bathroom to urinate Q2hrs for bladder retraining and proper sensory feedback   Discharge Goals: Time Frame: 12 weeks  1. Pt will score 17 on PFIQ-7 for overall functional improvement. 2. Pt will report improvement in pain levels to avg 1/10 to allow for sleeping throughout the night without waking secondary to pain. 3. Pt will report improvement in stress incontinence and 50% less leaks for decreased social anxiety at work. Rehabilitation Potential For Stated Goals: [address]'s therapy, I certify that the treatment plan above will be carried out by a therapist or under their direction. Thank you for this referral,  Franchesca Daniel, PT     Referring Physician Signature: Sharon Penny, Mahesh Holloway, *              Date                    The information in this section was collected on 05/19/17  (except where otherwise noted). HISTORY:   Present Symptoms:  Pain Intensity 1: 5  Pain Location 1: Hip   History of Present Injury/Illness (Reason for Referral):  Pt reports leaking for years worsening over past year. Reports symptoms of stress incontinence. Reports intermittent pain burning in area of vagina. Pain is constant. avg 3/10 increases to 7/10 at worst.  LBP more with sitting or resting at the end of the day L>R. Pt also reports chronic constipation avg Q3d. Past Medical History/Comorbidities:   Ms. Lupillo Dietz  has a past medical history of UTI (urinary tract infection). Ms. Lupillo Dietz  has a past surgical history that includes tubal ligation (1996).   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 , Disp: 90 Cap, Rfl: 3    Mth-Me Blue-Sod Phos-PhSal-Hyo (URIBEL) 118-10-40.8-36 mg cap capsule, Take 1 Cap by mouth four (4) times daily. , Disp: 40 Cap, Rfl: 2    silver sulfADIAZINE (SILVADENE) 1 % topical cream, Apply  to affected area daily. , Disp: 50 g, Rfl: 1   Date Last Reviewed:  05/19/17   Gynecological History:   · Number of pregnancies: 2, vaginal 2, C-sections   · Episiotomy: yes with 1st  Past Urinary Medical History:    · History of UTI, Menopause: test positive Q3 mos but have symptoms often; irregular period and periodic hot flashes  · Previous Treatments: none noted  Incontinence History:  PROBLEM: YES/NO: COMMENTS:   Loss of urine with coughing YES    Loss of urine with lifting  YES    Loss of urine with exercise, running NO    Loss of urine with strong urge NO    Loss of urine with approaching the bathroom NO    Loss of urine with key in lock NO    Loss of urine as getting to toilet/removing clothing NO    Loss of urine when hearing running water NO    Have difficulty initiating a urine stream YES    Have difficulty stopping urine stream YES sometimes   Have to strain to empty bladder YES    Dribble urine when urinating YES    Dribble urine after emptying bladder YES    Experience pain with urination YES    Experience burning during urination YES    Have blood in urine NO      Voiding Patterns:  Patient voids 2x/during the day and 0 times during the night. Patient reports that she empties bladder fully rarely. Patient uses pads for bladder protection; she changes pads 1 times per day. Medium saturation  Fluid Intake:  Patient drinks 6 cups of fluid per day. She consumes 2 cups of caffeinated beverages per day.   Patient does not limit fluid intake to control bladder. Bowel Habits:  Patient reports chronic constipation avg Q3d  Mobility / Self Care: independent  Personal / Social History:  · Sexually active? YES:   · Social activities restricted due to urinary incontinence? NO:       Number of Personal Factors/Comorbidities that affect the Plan of Care: 1-2: MODERATE COMPLEXITY   EXAMINATION:   External Observation:   · Voluntary Contraction: present  · Voluntary Relaxation: present  · Involuntary Contraction: absent  · Involuntary Relaxation: absent  · Perineal Body Assessment: WNL  · Anal [place]: not present  · Skin Integrity: WNL  · Q-tip Test: mild tenderness left  · Vaginal Vault Size: within normal limits  · Weak abdominals noted 3+/5 grossly  · Increased thoracic kyphosis and lumbar lordosis in standing    [place] PERFECT (Power/Endurnace/Repetitions/Fast Twitch/Elevation/Co-contraction/Timing) Scale:   · [place] PERFECT = 3/3/5/8//  · Tissue support test with bearing down:  Grade 1: not visible at introitus; Grade 2: visible at introitus; Grade 3: tissue outside introitus  · Anterior Wall = TBD   · Posterior Wall = TBD   · Apical = TBD   · Palpation:   Right Left   Bulbocavernosus  tender   Ischocavernosus  tender   Superficial Transverse Perineal     Sphincter Urethrae     Compressor Urethra      Urethra-vaginalis     Deep Transverse Perineium     Obturator Internus tender Tender with increased tone   Iliococcygeus  tender   Coccygeus  tender   Pubococcygeus  tender   Levator Ani     Adductor     Psoas tender tender      SEMG evaluation:  Date: 3/28/17  Resting Tone: 7uV  Quality of Resting Tone: irregular  5 Second Hold: 20 uV  10 Second Hold: 15 uV  Quality of Recruitment: Good   Quality of Relaxation: Fair   Quality of Holding: Fair   Stability of Hold: Fair   Stability of Rest: Fair          Body Structures Involved:  1. Digestive Structures  2. Joints  3. Muscles  4.  Ligaments Body Functions Affected:  1. Mental  2. Sensory/Pain  3. Genitourinary  4. Reproductive  5. Neuromusculoskeletal  6. Movement Related  7. Digestive Activities and Participation Affected:  1. Learning and Applying Knowledge  2. General Tasks and Demands  3. Mobility  4. Self Care  5. Domestic Life  6. Interpersonal Interactions and Relationships  7. Community, Social and [place]   Number of elements that affect the Plan of Care: 3: MODERATE COMPLEXITY   CLINICAL PRESENTATION:   Presentation: Evolving clinical presentation with changing clinical characteristics: MODERATE COMPLEXITY   CLINICAL DECISION MAKING:   Outcome Measure: Tool Used: Pelvic Floor Impact Questionnaire--short form 7 (PFIQ-7)   Score:  Initial: 22.2%  · Bladder or Urine: 23.81  · Bowel or Rectum: 28.57  · Vagina or Pelvis: 14.29 Most Recent: X (Date: -- )  · Bladder or Urine: X  · Bowel or Rectum: X  · Vagina or Pelvis: X   Interpretation of Score: Each of the 7 sections is scored on a scale from 0-3; 0 representing \"Not at all\", 3 representing \"Quite a bit\". The mean value is taken from all the answered items, then multiplied by 100 to obtain the scale score, ranging from 0-100. This process is repeated for each column representing bowel, bladder, and pelvic pain. ? Self Care:     - CURRENT STATUS: CJ - 20%-39% impaired, limited or restricted    - GOAL STATUS: CI - 1%-19% impaired, limited or restricted    - D/C STATUS:  ---------------To be determined---------------     Medical Necessity:   · Patient demonstrates good rehab potential due to higher previous functional level. Reason for Services/Other Comments:  · Patient continues to require skilled intervention due to ongoing goals noted above.    Use of outcome tool(s) and clinical judgement create a POC that gives a: Questionable prediction of patient's progress: MODERATE COMPLEXITY   TREATMENT:   (In addition to Assessment/Re-Assessment sessions the following treatments were rendered)  Pre-treatment Symptoms/Complaints:    Leaking is better reporting 50% improvement. Is trying to pay attention to when she has an urge now. Using 1ppd. Pain: Initial:   Pain Intensity 1: 5  Pain Location 1: Hip  Post Session:  2     THERAPEUTIC EXERCISE: ( 45 minutes):  Exercises per grid below to improve strength and coordination. Required minimal verbal and tactile cues to promote proper body mechanics and promote proper body breathing techniques. Progressed resistance and repetitions as indicated. 4/28/17 - included further assessment of left hip pain    Date:  3/22/17 Date:  3/28/17 Date:  4/28/17 Date:  5/12/17 Date:  5/19/17   Activity/Exercise Parameters Parameters Parameters     DKC  [id number]      Happy baby stretch [id number] 2x 45sec     Piriformis stretch  [id number]     PF drops with biofeedback  10'      Diaphragmatic breathing/relaxation with biofeedback  10'  With drops   10' See below   Prone press ups   2x10              biofeedback performed today to assess resting tone; relaxation techniques including diaphragmatic breathing and PFmm drops;    Strengthening w biofeedback: 10/10, 2/4, and modulation  Exercises:  Patient instructed in pelvic floor exercises listed below:  HEP: DKC, happy baby stretch  3/28/17: Added piriformis stretch, abdominal massage for peristalsis, diaphragmatic breathing for HEP  Manual Therapy: (15'): performed for relaxation of muscles to relieve guarding and pain  SCS and TPR to OI, LA internally, -held  SCS and STM to left piriformis externally  Manual axial distraction  The following educational topics were reviewed with patient:  Bladder health, tips to control urge, bladder diary, pelvic floor anatomy, how foods affect bladder, bladder retraining. Treatment/Session Assessment:    · Response to Treatment:  Excellent. Resting PFmm tone continuing to decrease. Started at [facility] beginning of session; decreased to 2uV with drops. Initiated strengthening 10/10 with good response and only mild increase of resting tone at end to 3uV which was able to return to 2 with light drops. · Compliance with Program/Exercises: Will assess as treatment progresses. · Recommendations/Intent for next treatment session: \"Next visit will focus on advancements to more challenging activities\".   Total Treatment Duration:  PT Patient Time In/Time Out  Time In: 1400  Time Out: 1501 Ruddy Ulrich PT

## 2017-05-26 ENCOUNTER — HOSPITAL ENCOUNTER (OUTPATIENT)
Dept: PHYSICAL THERAPY | Age: 42
Discharge: HOME | End: 2017-05-26
Attending: NURSE PRACTITIONER
Payer: COMMERCIAL

## 2017-05-26 DIAGNOSIS — M54.16 LEFT LUMBAR RADICULOPATHY: ICD-10-CM

## 2017-05-26 PROCEDURE — 97161 PT EVAL LOW COMPLEX 20 MIN: CPT

## 2017-05-27 NOTE — PROGRESS NOTES
Emily Weir  : 1975 Therapy Center at [place] [address] [facility], [address] [place] [address],[address], [facility].  [phone]   [phone]         OUTPATIENT PHYSICAL THERAPY:Initial Assessment 2017    ICD-10: Treatment Diagnosis: Low back pain M54,5, Sciatica Left side  M54.32  Precautions/Allergies:   Review of patient's allergies indicates no known allergies. Fall Risk Score: 0 (? 5 = High Risk)  MD Orders: Evaluate and treat MEDICAL/REFERRING DIAGNOSIS:  Left lumbar radiculopathy [M54.16]   DATE OF ONSET: 6 mos  REFERRING PHYSICIAN: Kristel Kapoor, *  RETURN PHYSICIAN APPOINTMENT: unknown     INITIAL ASSESSMENT:  Ms. Chrissie Silva is a current PT patient being seen for pelvic floor therapy. She presents today with signs and symptoms consistent with lumbar disc pathology and subsequent piriformis irritation exacerbating radicular symptoms. Good response to extension exercises centralizing symptoms. Pt will benefit from physical therapy to address stated problems. Plan of care was discussed and agreed upon with patient and HEP was initiated. Thank you for the opportunity to work with this patient. PROBLEM LIST (Impacting functional limitations):  1. Decreased Strength  2. Increased Pain  3. Decreased Activity Tolerance  4. Decreased Flexibility/Joint Mobility  5. Decreased [place] with Home Exercise Program INTERVENTIONS PLANNED:  1. Cold  2. Electrical Stimulation  3. Heat  4. Home Exercise Program (HEP)  5. Manual Therapy  6. Neuromuscular Re-education/Strengthening  7. Therapeutic Activites  8. Therapeutic Exercise/Strengthening  9. Ultrasound (US)   TREATMENT PLAN:  Effective Dates: 17 TO 17. Frequency/Duration: 1 time a week for 10 weeks  GOALS: (Goals have been discussed and agreed upon with patient.)  Short-Term Functional Goals: Time Frame: 2 weeks  1. Pt will be independent with HEP to assist in achieving below goals.   Discharge Goals: Time Frame: 10 weeks  1. Pt will report improvement in pain levels to intermittent and avg of 2/10 to allow for sleeping throughout the night without waking secondary to pain. 2. 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 Pt will demonstrate improvement in core/hip strength to 5-/5 for improved protection of spine and allow healing  4. Pt will demonstrate consistent proper body mechanics for decreased risk of reinjury or exacerbation of pain symptoms  Rehabilitation Potential For Stated Goals: [address]'s therapy, I certify that the treatment plan above will be carried out by a therapist or under their direction. Thank you for this referral,  Vanessa Cook, PT     Referring Physician Signature: Kate Sharma, *              Date                    The information in this section was collected on 17  (except where otherwise noted). HISTORY:   History of Present Injury/Illness (Reason for Referral):   initiating treatment of LB and left hip pain. Has started taking mobic and feels this is helping. Has had this pain for over a year but worsened over the past 6 mos. Worse if she has a hard day at work when she is extra busy and unable to sit and take any rest break. This causes increased pain when she gets home and difficulty sleeping during the night d/t pain. Worse with increased work, prolonged sitting in the car (can't sit >1hr), prolonged walking (>2hrs). Current pain 3/10;  8/10 at worst;  2-3/10 at best.  Pain radiates to posteriorlateral  Mid thigh with burning sensation extending to plantar aspect of left foot. Past Medical History/Comorbidities:   Ms. Ramone Friedman  has a past medical history of UTI (urinary tract infection). Ms. Ramone Friedman  has a past surgical history that includes tubal ligation (). Social History/Living Environment:     lives with family;   Denies routine exercise schedule  Prior Level of Function/Work/Activity:  Works as nurse at [email or web address]  Previous Treatment Approaches:          Taking mobic which she states has helped some  Personal Factors:          Sex:  female        Age:  38 y.o. Current Medications:       Current Outpatient Prescriptions:     meloxicam (MOBIC) 7.5 mg tablet, Take 1 Tab by mouth daily as needed for Pain., Disp: 30 Tab, Rfl: 2    linaclotide (LINZESS) 145 mcg cap capsule, Take 1 Cap by mouth Daily (before breakfast). , Disp: 90 Cap, Rfl: 3    Mth-Me Blue-Sod Phos-PhSal-Hyo (URIBEL) 118-10-40.8-36 mg cap capsule, Take 1 Cap by mouth four (4) times daily. , Disp: 40 Cap, Rfl: 2    silver sulfADIAZINE (SILVADENE) 1 % topical cream, Apply  to affected area daily. , Disp: 50 g, Rfl: 1   Date Last Reviewed:  05/30/17    Number of Personal Factors/Comorbidities that affect the Plan of Care: 1-2: MODERATE COMPLEXITY   EXAMINATION:   Observation/Orthostatic Postural Assessment:          Increased lumbar lordosis and thoracic kyphosis  Palpation:          Tender left piriformis, lateral LLE  ROM:          Lumbar ROM WFL  Bilateral SLR 50 ° bilat  Strength:          4/5 bilat hip abd and ext  3-/5 abdominals  Special Tests:          (-) axial compression  Pain relieved with distraction  (+) left sciatic neural tension test   Body Structures Involved:  1. Nerves  2. Joints  3. Muscles  4. Ligaments Body Functions Affected:  1. Sensory/Pain  2. Neuromusculoskeletal  3. Movement Related Activities and Participation Affected:  1. Learning and Applying Knowledge  2. Mobility  3. Self Care   Number of elements (examined above) that affect the Plan of Care: 1-2: LOW COMPLEXITY   CLINICAL PRESENTATION:   Presentation: Stable and uncomplicated: LOW COMPLEXITY   CLINICAL DECISION MAKING:   Outcome Measure:    Tool Used: Modified Oswestry Low Back Pain Questionnaire  Score:  Initial: TBD/50  Most Recent: X/50 (Date: -- )   Interpretation of Score: Each section is scored on a 0-5 scale, 5 representing the greatest disability. The scores of each section are added together for a total score of 50. Score 0 1-10 11-20 21-30 31-40 41-49 50   Modifier CH CI CJ CK CL CM CN       Medical Necessity:   · Patient demonstrates good rehab potential due to higher previous functional level. Reason for Services/Other Comments:  · Patient continues to require skilled intervention due to ongoing goals noted above. Use of outcome tool(s) and clinical judgement create a POC that gives a: Clear prediction of patient's progress: LOW COMPLEXITY            TREATMENT:   (In addition to Assessment/Re-Assessment sessions the following treatments were rendered)  Pre-treatment Symptoms/Complaints:  Pain in left glut radiating into mid lateral left thigh  Pain: Initial:   Pain Intensity 1: 5 5 Post Session:  2     THERAPEUTIC EXERCISE: (10 minutes):  Exercises per grid below to improve mobility and strength. Required minimal verbal and tactile cues to promote proper body posture and promote proper body mechanics. Progressed range and repetitions as indicated. Date:  5/26/17 Date:   Date:     Activity/Exercise Parameters Parameters Parameters   Sciatic neural threading 2x10 reps     Prone press ups 10x                                        Treatment/Session Assessment:  Pt reported good understanding of plan of care as well as exercises. All questions were answered and pt was invited to call with any further questions or issues  · Response to Treatment:  Good. Able to centralize symptoms to central left glut. · Compliance with Program/Exercises: Will assess as treatment progresses. · Recommendations/Intent for next treatment session: \"Next visit will focus on advancements to more challenging activities\".   Total Treatment Duration:  PT Patient Time In/Time Out  Time In: 1400  Time Out: 1501 Ruddy Ulrich, PT

## 2017-05-30 NOTE — PROGRESS NOTES
Ambulatory/Rehab Services H2 Model Falls Risk Assessment    Risk Factor Pts. ·   Confusion/Disorientation/Impulsivity  []    4 ·   Symptomatic Depression  []   2 ·   Altered Elimination  []   1 ·   Dizziness/Vertigo  []   1 ·   Gender (Male)  []   1 ·   Any administered antiepileptics (anticonvulsants):  []   2 ·   Any administered benzodiazepines:  []   1 ·   Visual Impairment (specify):  []   1 ·   Portable Oxygen Use  []   1 ·   Orthostatic ? BP  []   1 ·   History of Recent Falls (within 3 mos.)  []   5     Ability to Rise from Chair (choose one) Pts. ·   Ability to rise in a single movement  [x]   0 ·   Pushes up, successful in one attempt  []   1 ·   Multiple attempts, but successful  []   3 ·   Unable to rise without assistance  []   4   Total: (5 or greater = High Risk) 0     Falls Prevention Plan:   []                Physical Limitations to Exercise (specify):   []                Mobility Assistance Device (type):   []                Exercise/Equipment Adaptation (specify):    ©2010 [facility] Patent #[id number].  Federal Law prohibits the replication, distribution or use without written permission from [facility] [employer]

## 2017-06-09 ENCOUNTER — APPOINTMENT (OUTPATIENT)
Dept: PHYSICAL THERAPY | Age: 42
End: 2017-06-09
Attending: NURSE PRACTITIONER
Payer: COMMERCIAL

## 2017-06-16 ENCOUNTER — APPOINTMENT (OUTPATIENT)
Dept: PHYSICAL THERAPY | Age: 42
End: 2017-06-16
Attending: NURSE PRACTITIONER
Payer: COMMERCIAL

## 2017-06-23 ENCOUNTER — HOSPITAL ENCOUNTER (OUTPATIENT)
Dept: PHYSICAL THERAPY | Age: 42
Discharge: HOME | End: 2017-06-23
Attending: NURSE PRACTITIONER
Payer: COMMERCIAL

## 2017-06-23 PROCEDURE — 97110 THERAPEUTIC EXERCISES: CPT

## 2017-06-23 PROCEDURE — 97140 MANUAL THERAPY 1/> REGIONS: CPT

## 2017-06-23 NOTE — PROGRESS NOTES
Pollo Cowan  : 1975 [facility] [place] [id number] [facility], [address] [place] [address],[address], [address] [facility]  [phone]   [phone]         OUTPATIENT PHYSICAL THERAPY:Daily Note 2017    ICD-10: Treatment Diagnosis: Low back pain M54,5, Sciatica Left side  M54.32  Precautions/Allergies:   Review of patient's allergies indicates no known allergies. Fall Risk Score: 0 (? 5 = High Risk)  MD Orders: Evaluate and treat MEDICAL/REFERRING DIAGNOSIS:  Unspecified urinary incontinence [R32]   DATE OF ONSET: 6 mos  REFERRING PHYSICIAN: Pop Li, *  RETURN PHYSICIAN APPOINTMENT: unknown     INITIAL ASSESSMENT:  Ms. Italia Murphy is a current PT patient being seen for pelvic floor therapy. She presents today with signs and symptoms consistent with lumbar disc pathology and subsequent piriformis irritation exacerbating radicular symptoms. Good response to extension exercises centralizing symptoms. Pt will benefit from physical therapy to address stated problems. Plan of care was discussed and agreed upon with patient and HEP was initiated. Thank you for the opportunity to work with this patient. PROBLEM LIST (Impacting functional limitations):  1. Decreased Strength  2. Increased Pain  3. Decreased Activity Tolerance  4. Decreased Flexibility/Joint Mobility  5. Decreased [place] with Home Exercise Program INTERVENTIONS PLANNED:  1. Cold  2. Electrical Stimulation  3. Heat  4. Home Exercise Program (HEP)  5. Manual Therapy  6. Neuromuscular Re-education/Strengthening  7. Therapeutic Activites  8. Therapeutic Exercise/Strengthening  9. Ultrasound (US)   TREATMENT PLAN:  Effective Dates: 17 TO 17. Frequency/Duration: 1 time a week for 10 weeks  GOALS: (Goals have been discussed and agreed upon with patient.)  Short-Term Functional Goals: Time Frame: 2 weeks  1. Pt will be independent with HEP to assist in achieving below goals.   Discharge Goals: Time Frame: 10 weeks  1. Pt will report improvement in pain levels to intermittent and avg of 2/10 to allow for sleeping throughout the night without waking secondary to pain. 2. 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 Pt will demonstrate improvement in core/hip strength to 5-/5 for improved protection of spine and allow healing  4. Pt will demonstrate consistent proper body mechanics for decreased risk of reinjury or exacerbation of pain symptoms  Rehabilitation Potential For Stated Goals: [address]'s therapy, I certify that the treatment plan above will be carried out by a therapist or under their direction. Thank you for this referral,  Rob Bee, PT     Referring Physician Signature: Kate Sharma, *              Date                    The information in this section was collected on 06/23/17  (except where otherwise noted). HISTORY:   History of Present Injury/Illness (Reason for Referral):   initiating treatment of LB and left hip pain. Has started taking mobic and feels this is helping. Has had this pain for over a year but worsened over the past 6 mos. Worse if she has a hard day at work when she is extra busy and unable to sit and take any rest break. This causes increased pain when she gets home and difficulty sleeping during the night d/t pain. Worse with increased work, prolonged sitting in the car (can't sit >1hr), prolonged walking (>2hrs). Current pain 3/10;  8/10 at worst;  2-3/10 at best.  Pain radiates to posteriorlateral  Mid thigh with burning sensation extending to plantar aspect of left foot. Past Medical History/Comorbidities:   Ms. Ramone Friedman  has a past medical history of UTI (urinary tract infection). Ms. Ramone Friedman  has a past surgical history that includes tubal ligation (1996). Social History/Living Environment:     lives with family;   Denies routine exercise schedule  Prior Level of Function/Work/Activity:  Works as nurse at [employer]  Previous Treatment Approaches:          Taking mobic which she states has helped some  Personal Factors:          Sex:  female        Age:  38 y.o. Current Medications:       Current Outpatient Prescriptions:     meloxicam (MOBIC) 7.5 mg tablet, Take 1 Tab by mouth daily as needed for Pain., Disp: 30 Tab, Rfl: 2    linaclotide (LINZESS) 145 mcg cap capsule, Take 1 Cap by mouth Daily (before breakfast). , Disp: 90 Cap, Rfl: 3    Mth-Me Blue-Sod Phos-PhSal-Hyo (URIBEL) 118-10-40.8-36 mg cap capsule, Take 1 Cap by mouth four (4) times daily. , Disp: 40 Cap, Rfl: 2    silver sulfADIAZINE (SILVADENE) 1 % topical cream, Apply  to affected area daily. , Disp: 50 g, Rfl: 1   Date Last Reviewed:  06/23/17    Number of Personal Factors/Comorbidities that affect the Plan of Care: 1-2: MODERATE COMPLEXITY   EXAMINATION:   Observation/Orthostatic Postural Assessment:          Increased lumbar lordosis and thoracic kyphosis  Palpation:          Tender left piriformis, lateral LLE  ROM:          Lumbar ROM WFL  Bilateral SLR 50 ° bilat  Strength:          4/5 bilat hip abd and ext  3-/5 abdominals  Special Tests:          (-) axial compression  Pain relieved with distraction  (+) left sciatic neural tension test   Body Structures Involved:  1. Nerves  2. Joints  3. Muscles  4. Ligaments Body Functions Affected:  1. Sensory/Pain  2. Neuromusculoskeletal  3. Movement Related Activities and Participation Affected:  1. Learning and Applying Knowledge  2. Mobility  3. Self Care   Number of elements (examined above) that affect the Plan of Care: 1-2: LOW COMPLEXITY   CLINICAL PRESENTATION:   Presentation: Stable and uncomplicated: LOW COMPLEXITY   CLINICAL DECISION MAKING:   Outcome Measure:    Tool Used: Modified Oswestry Low Back Pain Questionnaire  Score:  Initial: TBD/50  Most Recent: X/50 (Date: -- )   Interpretation of Score: Each section is scored on a 0-5 scale, 5 representing the greatest disability. The scores of each section are added together for a total score of 50. Score 0 1-10 11-20 21-30 31-40 41-49 50   Modifier CH CI CJ CK CL CM CN       Medical Necessity:   · Patient demonstrates good rehab potential due to higher previous functional level. Reason for Services/Other Comments:  · Patient continues to require skilled intervention due to ongoing goals noted above. Use of outcome tool(s) and clinical judgement create a POC that gives a: Clear prediction of patient's progress: LOW COMPLEXITY            TREATMENT:   (In addition to Assessment/Re-Assessment sessions the following treatments were rendered)  Pre-treatment Symptoms/Complaints:  Pain in left glut radiating into mid lateral left thigh- 6/23/2017 Pt reports that the pain has improved a little but she still has burning down one leg. Pain: Initial:   Pain Intensity 1: 5 5 Post Session:  2     THERAPEUTIC EXERCISE: (30 minutes):  Exercises per grid below to improve mobility and strength. Required minimal verbal and tactile cues to promote proper body posture and promote proper body mechanics. Progressed range and repetitions as indicated. Date:  5/26/17 Date:  06-23-17 Date:     Activity/Exercise Parameters Parameters Parameters   Sciatic neural threading 2x10 reps 20 reps    Prone press ups 10x 10 reps    Inversion table  PT assist 4 min with 1 min bouts    Isometric hip flex with TA  10x5 sec hold each    bridge  20 reps    SLR with TA  10 reps each    shirlene pose  5x10 sec hold    Cow stretch  15 reps                       MANUAL THERAPY (10 min minutes)  Piriformis and glute release, CPA to L2-5 Grade III-IV, L LE long axis traction To promote tissue length and joint mobility for return to previous level of function. Treatment/Session Assessment:  Pt reported improved symptoms with long axis traction and inversion table. · Response to Treatment:  Good.   Able to centralize symptoms to central left glut. · Compliance with Program/Exercises: Will assess as treatment progresses. · Recommendations/Intent for next treatment session: \"Next visit will focus on advancements to more challenging activities\".   Total Treatment Duration:  PT Patient Time In/Time Out  Time In: 1350  Time Out: [address]

## 2017-06-29 ENCOUNTER — HOSPITAL ENCOUNTER (OUTPATIENT)
Dept: PHYSICAL THERAPY | Age: 42
Discharge: HOME | End: 2017-06-29
Attending: NURSE PRACTITIONER
Payer: COMMERCIAL

## 2017-06-29 PROCEDURE — 97140 MANUAL THERAPY 1/> REGIONS: CPT

## 2017-06-29 PROCEDURE — 97110 THERAPEUTIC EXERCISES: CPT

## 2017-06-29 NOTE — PROGRESS NOTES
Adryan Clark  : 1975 [facility] at [address], [address] [place] [address],[address], [address] [facility]  [phone]   [phone]         OUTPATIENT PHYSICAL THERAPY:Daily Note 2017    ICD-10: Treatment Diagnosis: Low back pain M54,5, Sciatica Left side  M54.32  Precautions/Allergies:   Review of patient's allergies indicates no known allergies. Fall Risk Score: 0 (? 5 = High Risk)  MD Orders: Evaluate and treat MEDICAL/REFERRING DIAGNOSIS:  Unspecified urinary incontinence [R32]   DATE OF ONSET: 6 mos  REFERRING PHYSICIAN: Mary Kay Garcia, Pierce Lee, *  RETURN PHYSICIAN APPOINTMENT: unknown     INITIAL ASSESSMENT:  Ms. Jayne Leiva is a current PT patient being seen for pelvic floor therapy. She presents today with signs and symptoms consistent with lumbar disc pathology and subsequent piriformis irritation exacerbating radicular symptoms. Good response to extension exercises centralizing symptoms. Pt will benefit from physical therapy to address stated problems. Plan of care was discussed and agreed upon with patient and HEP was initiated. Thank you for the opportunity to work with this patient. PROBLEM LIST (Impacting functional limitations):  1. Decreased Strength  2. Increased Pain  3. Decreased Activity Tolerance  4. Decreased Flexibility/Joint Mobility  5. Decreased [place] with Home Exercise Program INTERVENTIONS PLANNED:  1. Cold  2. Electrical Stimulation  3. Heat  4. Home Exercise Program (HEP)  5. Manual Therapy  6. Neuromuscular Re-education/Strengthening  7. Therapeutic Activites  8. Therapeutic Exercise/Strengthening  9. Ultrasound (US)   TREATMENT PLAN:  Effective Dates: 17 TO 17. Frequency/Duration: 1 time a week for 10 weeks  GOALS: (Goals have been discussed and agreed upon with patient.)  Short-Term Functional Goals: Time Frame: 2 weeks  1. Pt will be independent with HEP to assist in achieving below goals.   Discharge Goals: Time Frame: 10 weeks  1. Pt will report improvement in pain levels to intermittent and avg of 2/10 to allow for sleeping throughout the night without waking secondary to pain. 2. 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 Pt will demonstrate improvement in core/hip strength to 5-/5 for improved protection of spine and allow healing  4. Pt will demonstrate consistent proper body mechanics for decreased risk of reinjury or exacerbation of pain symptoms  Rehabilitation Potential For Stated Goals: [address]'s therapy, I certify that the treatment plan above will be carried out by a therapist or under their direction. Thank you for this referral,  Ashley Mario, PT     Referring Physician Signature: Juana Abreu, *              Date                    The information in this section was collected on 06/29/17  (except where otherwise noted). HISTORY:   History of Present Injury/Illness (Reason for Referral):   initiating treatment of LB and left hip pain. Has started taking mobic and feels this is helping. Has had this pain for over a year but worsened over the past 6 mos. Worse if she has a hard day at work when she is extra busy and unable to sit and take any rest break. This causes increased pain when she gets home and difficulty sleeping during the night d/t pain. Worse with increased work, prolonged sitting in the car (can't sit >1hr), prolonged walking (>2hrs). Current pain 3/10;  8/10 at worst;  2-3/10 at best.  Pain radiates to posteriorlateral  Mid thigh with burning sensation extending to plantar aspect of left foot. Past Medical History/Comorbidities:   Ms. Edward Hawkins  has a past medical history of UTI (urinary tract infection). Ms. Edward Hawkins  has a past surgical history that includes tubal ligation (1996). Social History/Living Environment:     lives with family;   Denies routine exercise schedule  Prior Level of Function/Work/Activity:  Works as nurse at [employer]  Previous Treatment Approaches:          Taking mobic which she states has helped some  Personal Factors:          Sex:  female        Age:  38 y.o. Current Medications:       Current Outpatient Prescriptions:     meloxicam (MOBIC) 7.5 mg tablet, Take 1 Tab by mouth daily as needed for Pain., Disp: 30 Tab, Rfl: 2    linaclotide (LINZESS) 145 mcg cap capsule, Take 1 Cap by mouth Daily (before breakfast). , Disp: 90 Cap, Rfl: 3    Mth-Me Blue-Sod Phos-PhSal-Hyo (URIBEL) 118-10-40.8-36 mg cap capsule, Take 1 Cap by mouth four (4) times daily. , Disp: 40 Cap, Rfl: 2    silver sulfADIAZINE (SILVADENE) 1 % topical cream, Apply  to affected area daily. , Disp: 50 g, Rfl: 1   Date Last Reviewed:  06/29/17    Number of Personal Factors/Comorbidities that affect the Plan of Care: 1-2: MODERATE COMPLEXITY   EXAMINATION:   Observation/Orthostatic Postural Assessment:          Increased lumbar lordosis and thoracic kyphosis  Palpation:          Tender left piriformis, lateral LLE  ROM:          Lumbar ROM WFL  Bilateral SLR 50 ° bilat  Strength:          4/5 bilat hip abd and ext  3-/5 abdominals  Special Tests:          (-) axial compression  Pain relieved with distraction  (+) left sciatic neural tension test   Body Structures Involved:  1. Nerves  2. Joints  3. Muscles  4. Ligaments Body Functions Affected:  1. Sensory/Pain  2. Neuromusculoskeletal  3. Movement Related Activities and Participation Affected:  1. Learning and Applying Knowledge  2. Mobility  3. Self Care   Number of elements (examined above) that affect the Plan of Care: 1-2: LOW COMPLEXITY   CLINICAL PRESENTATION:   Presentation: Stable and uncomplicated: LOW COMPLEXITY   CLINICAL DECISION MAKING:   Outcome Measure:    Tool Used: Modified Oswestry Low Back Pain Questionnaire  Score:  Initial: TBD/50  Most Recent: X/50 (Date: -- )   Interpretation of Score: Each section is scored on a 0-5 scale, 5 representing the greatest disability. The scores of each section are added together for a total score of 50. Score 0 1-10 11-20 21-30 31-40 41-49 50   Modifier CH CI CJ CK CL CM CN       Medical Necessity:   · Patient demonstrates good rehab potential due to higher previous functional level. Reason for Services/Other Comments:  · Patient continues to require skilled intervention due to ongoing goals noted above. Use of outcome tool(s) and clinical judgement create a POC that gives a: Clear prediction of patient's progress: LOW COMPLEXITY            TREATMENT:   (In addition to Assessment/Re-Assessment sessions the following treatments were rendered)  Pre-treatment Symptoms/Complaints:  Pain in left glut radiating into mid lateral left thigh- 6/29/2017 Pt reports that she is still hurting at the end of a work day. Pain: Initial:   Pain Intensity 1: 5  Post Session:  2     THERAPEUTIC EXERCISE: (30 minutes):  Exercises per grid below to improve mobility and strength. Required minimal verbal and tactile cues to promote proper body posture and promote proper body mechanics. Progressed range and repetitions as indicated. Date:  5/26/17 Date:  06-23-17 Date:  06-29-17   Activity/Exercise Parameters Parameters Parameters   Sciatic neural threading 2x10 reps 20 reps 20 reps   Prone press ups 10x 10 reps    Inversion table  PT assist 4 min with 1 min bouts    Isometric hip flex with TA  10x5 sec hold each 10x5 sec hold each   bridge  20 reps 20 reps   SLR with TA  10 reps each 15 reps each   shirlene pose  5x10 sec hold 3x30 sec hold   Cow stretch  15 reps    nustep   6 min level 4.0   Pull downs with TA   Green x 20 reps          MANUAL THERAPY (10 min minutes)  Piriformis and glute release, CPA to L2-5 Grade III-IV, L LE long axis traction To promote tissue length and joint mobility for return to previous level of function.   Treatment/Session Assessment:  D/C inversion table today due to pt did not like being upside down. Is slowly progressing. · Response to Treatment:  Good. Able to centralize symptoms to central left glut. · Compliance with Program/Exercises: Will assess as treatment progresses. · Recommendations/Intent for next treatment session: \"Next visit will focus on advancements to more challenging activities\".   Total Treatment Duration:  PT Patient Time In/Time Out  Time In: 0903  Time Out: [address] [facility], PT

## 2017-06-30 ENCOUNTER — APPOINTMENT (OUTPATIENT)
Dept: PHYSICAL THERAPY | Age: 42
End: 2017-06-30
Attending: NURSE PRACTITIONER
Payer: COMMERCIAL

## 2017-07-07 ENCOUNTER — APPOINTMENT (OUTPATIENT)
Dept: PHYSICAL THERAPY | Age: 42
End: 2017-07-07
Attending: NURSE PRACTITIONER

## 2018-03-05 ENCOUNTER — APPOINTMENT (RX ONLY)
Dept: URBAN - METROPOLITAN AREA CLINIC 349 | Facility: CLINIC | Age: 43
Setting detail: DERMATOLOGY
End: 2018-03-05

## 2018-03-05 DIAGNOSIS — L29.89 OTHER PRURITUS: ICD-10-CM

## 2018-03-05 DIAGNOSIS — L29.8 OTHER PRURITUS: ICD-10-CM

## 2018-03-05 DIAGNOSIS — L259 CONTACT DERMATITIS AND OTHER ECZEMA, UNSPECIFIED CAUSE: ICD-10-CM

## 2018-03-05 PROBLEM — L85.3 XEROSIS CUTIS: Status: ACTIVE | Noted: 2018-03-05

## 2018-03-05 PROBLEM — L23.9 ALLERGIC CONTACT DERMATITIS, UNSPECIFIED CAUSE: Status: ACTIVE | Noted: 2018-03-05

## 2018-03-05 PROCEDURE — ? TREATMENT REGIMEN

## 2018-03-05 PROCEDURE — ? COUNSELING

## 2018-03-05 PROCEDURE — 96372 THER/PROPH/DIAG INJ SC/IM: CPT

## 2018-03-05 PROCEDURE — 99214 OFFICE O/P EST MOD 30 MIN: CPT | Mod: 25

## 2018-03-05 PROCEDURE — ? OTHER

## 2018-03-05 PROCEDURE — ? INTRAMUSCULAR KENALOG

## 2018-03-05 PROCEDURE — ? PRESCRIPTION

## 2018-03-05 RX ORDER — DESOXIMETASONE 2.5 MG/ML
SPRAY TOPICAL
Qty: 1 | Refills: 1 | Status: ERX | COMMUNITY
Start: 2018-03-05

## 2018-03-05 RX ADMIN — DESOXIMETASONE: 2.5 SPRAY TOPICAL at 18:49

## 2018-03-05 ASSESSMENT — LOCATION SIMPLE DESCRIPTION DERM
LOCATION SIMPLE: RIGHT WRIST
LOCATION SIMPLE: CHEST
LOCATION SIMPLE: LEFT POPLITEAL SKIN
LOCATION SIMPLE: RIGHT SUPERIOR EYELID
LOCATION SIMPLE: RIGHT FOREARM
LOCATION SIMPLE: RIGHT BUTTOCK

## 2018-03-05 ASSESSMENT — LOCATION ZONE DERM
LOCATION ZONE: LEG
LOCATION ZONE: TRUNK
LOCATION ZONE: EYELID
LOCATION ZONE: ARM

## 2018-03-05 ASSESSMENT — LOCATION DETAILED DESCRIPTION DERM
LOCATION DETAILED: RIGHT LATERAL SUPERIOR EYELID
LOCATION DETAILED: MIDDLE STERNUM
LOCATION DETAILED: RIGHT BUTTOCK
LOCATION DETAILED: RIGHT VENTRAL WRIST
LOCATION DETAILED: LEFT POPLITEAL SKIN
LOCATION DETAILED: RIGHT VENTRAL PROXIMAL FOREARM

## 2018-03-05 NOTE — PROCEDURE: OTHER
Note Text (......Xxx Chief Complaint.): This diagnosis correlates with the
Other (Free Text): Pt takes Benadryl to relieve symptoms
Detail Level: Detailed
Other (Free Text): Pt reports working out in the yard and it appears she may have came in contact with poison Ivy\\nAdvised Pt to avoid areas that may contain poison ivy in her yard\\nCounseled pt on side effects of IM kenalog, pt denies diabetes and hypertension

## 2018-07-16 ENCOUNTER — HOSPITAL ENCOUNTER (OUTPATIENT)
Dept: MAMMOGRAPHY | Age: 43
Discharge: HOME | End: 2018-07-16
Attending: NURSE PRACTITIONER
Payer: COMMERCIAL

## 2018-07-16 DIAGNOSIS — Z12.39 SPECIAL SCREENING EXAMINATION FOR NEOPLASM OF BREAST: ICD-10-CM

## 2018-07-16 PROCEDURE — 77067 SCR MAMMO BI INCL CAD: CPT

## 2018-08-21 PROBLEM — R00.2 PALPITATIONS: Status: ACTIVE | Noted: 2018-08-21

## 2018-09-07 ENCOUNTER — HOSPITAL ENCOUNTER (OUTPATIENT)
Dept: LAB | Age: 43
Discharge: HOME | End: 2018-09-07

## 2018-09-07 PROCEDURE — 88305 TISSUE EXAM BY PATHOLOGIST: CPT

## 2021-10-27 ENCOUNTER — HOSPITAL ENCOUNTER (OUTPATIENT)
Dept: CT IMAGING | Age: 46
Discharge: HOME | End: 2021-10-27
Attending: NURSE PRACTITIONER
Payer: COMMERCIAL

## 2021-10-27 DIAGNOSIS — R31.29 MICROHEMATURIA: ICD-10-CM

## 2021-10-27 PROCEDURE — 74011000258 HC RX REV CODE- 258: Performed by: NURSE PRACTITIONER

## 2021-10-27 PROCEDURE — 74011000636 HC RX REV CODE- 636: Performed by: NURSE PRACTITIONER

## 2021-10-27 PROCEDURE — 74178 CT ABD&PLV WO CNTR FLWD CNTR: CPT

## 2021-10-27 RX ORDER — SODIUM CHLORIDE 0.9 % (FLUSH) 0.9 %
10 SYRINGE (ML) INJECTION
Status: COMPLETED | OUTPATIENT
Start: 2021-10-27 | End: 2021-10-27

## 2021-10-27 RX ADMIN — SODIUM CHLORIDE 100 ML: 900 INJECTION, SOLUTION INTRAVENOUS at 13:45

## 2021-10-27 RX ADMIN — IOPAMIDOL 100 ML: 755 INJECTION, SOLUTION INTRAVENOUS at 13:44

## 2021-10-27 RX ADMIN — Medication 10 ML: at 13:45

## 2021-12-06 ENCOUNTER — HOSPITAL ENCOUNTER (OUTPATIENT)
Dept: MRI IMAGING | Age: 46
Discharge: HOME | End: 2021-12-06
Attending: UROLOGY
Payer: COMMERCIAL

## 2021-12-06 DIAGNOSIS — N36.1 URETHRAL DIVERTICULUM: ICD-10-CM

## 2021-12-06 PROCEDURE — 72197 MRI PELVIS W/O & W/DYE: CPT

## 2021-12-06 PROCEDURE — A9576 INJ PROHANCE MULTIPACK: HCPCS | Performed by: UROLOGY

## 2021-12-06 PROCEDURE — 74011250636 HC RX REV CODE- 250/636: Performed by: UROLOGY

## 2021-12-06 RX ORDER — SODIUM CHLORIDE 0.9 % (FLUSH) 0.9 %
10 SYRINGE (ML) INJECTION
Status: COMPLETED | OUTPATIENT
Start: 2021-12-06 | End: 2021-12-06

## 2021-12-06 RX ADMIN — Medication 10 ML: at 10:46

## 2021-12-06 RX ADMIN — GADOTERIDOL 13 ML: 279.3 INJECTION, SOLUTION INTRAVENOUS at 10:46

## 2022-03-18 PROBLEM — N39.0 FREQUENT UTI: Status: ACTIVE | Noted: 2017-01-18

## 2022-03-20 PROBLEM — R00.2 PALPITATIONS: Status: ACTIVE | Noted: 2018-08-21

## 2022-08-25 ENCOUNTER — HOSPITAL ENCOUNTER (OUTPATIENT)
Dept: MAMMOGRAPHY | Age: 47
Discharge: HOME | End: 2022-08-28
Payer: COMMERCIAL

## 2022-08-25 DIAGNOSIS — Z12.31 BREAST CANCER SCREENING BY MAMMOGRAM: ICD-10-CM

## 2022-08-25 PROCEDURE — 77063 BREAST TOMOSYNTHESIS BI: CPT

## 2022-08-26 NOTE — RESULT ENCOUNTER NOTE
Your recent mammogram result normal.    NO SPECIFIC MAMMOGRAPHIC EVIDENCE FOR MALIGNANCY.   FOLLOW UP BILATERAL SCREENING  MAMMOGRAPHY IS RECOMMENDED IN ONE YEAR

## 2022-09-13 LAB
CHOLEST SERPL-MCNC: 204 MG/DL
GLUCOSE SERPL-MCNC: 105 MG/DL (ref 65–100)
HDLC SERPL-MCNC: 44 MG/DL (ref 40–60)
LDLC SERPL CALC-MCNC: 95.2 MG/DL
TRIGL SERPL-MCNC: 324 MG/DL (ref 35–150)

## 2023-11-09 ENCOUNTER — TRANSCRIBE ORDERS (OUTPATIENT)
Dept: SCHEDULING | Age: 48
End: 2023-11-09

## 2023-11-09 DIAGNOSIS — Z12.31 VISIT FOR SCREENING MAMMOGRAM: Primary | ICD-10-CM

## 2023-12-15 ENCOUNTER — HOSPITAL ENCOUNTER (OUTPATIENT)
Dept: MAMMOGRAPHY | Age: 48
Discharge: HOME | End: 2023-12-15
Payer: COMMERCIAL

## 2023-12-15 VITALS — HEIGHT: 60 IN | WEIGHT: 148 LBS | BODY MASS INDEX: 29.06 KG/M2

## 2023-12-15 DIAGNOSIS — Z12.31 VISIT FOR SCREENING MAMMOGRAM: ICD-10-CM

## 2023-12-15 PROCEDURE — 77063 BREAST TOMOSYNTHESIS BI: CPT

## 2024-11-19 ENCOUNTER — OFFICE VISIT (OUTPATIENT)
Age: 49
End: 2024-11-19
Payer: COMMERCIAL

## 2024-11-19 VITALS — HEIGHT: 59 IN | WEIGHT: 150.9 LBS | BODY MASS INDEX: 30.42 KG/M2

## 2024-11-19 DIAGNOSIS — M54.50 LOW BACK PAIN, UNSPECIFIED BACK PAIN LATERALITY, UNSPECIFIED CHRONICITY, UNSPECIFIED WHETHER SCIATICA PRESENT: Primary | ICD-10-CM

## 2024-11-19 DIAGNOSIS — M54.16 LUMBAR RADICULOPATHY: ICD-10-CM

## 2024-11-19 DIAGNOSIS — M51.362 DEGENERATION OF INTERVERTEBRAL DISC OF LUMBAR REGION WITH DISCOGENIC BACK PAIN AND LOWER EXTREMITY PAIN: ICD-10-CM

## 2024-11-19 DIAGNOSIS — M46.1 SACROILIITIS (HCC): ICD-10-CM

## 2024-11-19 PROCEDURE — G8428 CUR MEDS NOT DOCUMENT: HCPCS | Performed by: NURSE PRACTITIONER

## 2024-11-19 PROCEDURE — G8419 CALC BMI OUT NRM PARAM NOF/U: HCPCS | Performed by: NURSE PRACTITIONER

## 2024-11-19 PROCEDURE — 99204 OFFICE O/P NEW MOD 45 MIN: CPT | Performed by: NURSE PRACTITIONER

## 2024-11-19 PROCEDURE — 1036F TOBACCO NON-USER: CPT | Performed by: NURSE PRACTITIONER

## 2024-11-19 PROCEDURE — G8484 FLU IMMUNIZE NO ADMIN: HCPCS | Performed by: NURSE PRACTITIONER

## 2024-11-19 PROCEDURE — 3017F COLORECTAL CA SCREEN DOC REV: CPT | Performed by: NURSE PRACTITIONER

## 2024-11-19 RX ORDER — ATORVASTATIN CALCIUM 40 MG/1
40 TABLET, FILM COATED ORAL DAILY
COMMUNITY

## 2024-11-19 RX ORDER — CYCLOBENZAPRINE HCL 10 MG
10 TABLET ORAL 3 TIMES DAILY PRN
Qty: 30 TABLET | Refills: 0 | Status: SHIPPED | OUTPATIENT
Start: 2024-11-19 | End: 2024-11-29

## 2024-11-19 RX ORDER — ERGOCALCIFEROL 1.25 MG/1
50000 CAPSULE, LIQUID FILLED ORAL
COMMUNITY
Start: 2024-10-23

## 2024-11-19 RX ORDER — MELOXICAM 15 MG/1
15 TABLET ORAL DAILY PRN
Qty: 30 TABLET | Refills: 0 | Status: SHIPPED | OUTPATIENT
Start: 2024-11-19

## 2024-11-19 NOTE — PROGRESS NOTES
Name: Summer Haley  YOB: 1975  Gender: female  MRN: [id number]    CC: Low back pain, bilateral hip pain, left buttock/leg pain    HPI: This is a 49 y.o. year old female who presents with ongoing history of complaints of low back pain rating into both legs but primarily the left.  Greatest source of pain is in the left buttock rating down the posterior lateral leg to her foot.  She gets similar symptoms on the right.  Occasionally she can get bilateral groin pain.  She recently finished a steroid pack by her primary doctor which was beneficial while she was taking it then her pain returned.  She takes at least 800 mg ibuprofen at night before bed.  Pain is worse in the mornings and at night.      The patient denies any change in bowel or bladder function since the onset of the symptoms. she  has not had lumbar surgery in the past.      Thus far, the patient has tried NSAIDS, oral steroids, and physician directed home exercise program    Current pain level: 6-7  Activities limited by pain:             No data to display                     ROS/Meds/PSH/PMH/FH/SH: I personally reviewed the patient's collected intake data.  Below are the pertinents:    No Known Allergies      Current Outpatient Medications:     vitamin D (ERGOCALCIFEROL) 1.25 MG (56926 UT) CAPS capsule, Take 1 capsule by mouth Twice a Week, Disp: , Rfl:     atorvastatin (LIPITOR) 40 MG tablet, Take 1 tablet by mouth daily, Disp: , Rfl:     cyclobenzaprine (FLEXERIL) 10 MG tablet, Take 1 tablet by mouth 3 times daily as needed for Muscle spasms, Disp: 30 tablet, Rfl: 0    meloxicam (MOBIC) 15 MG tablet, Take 1 tablet by mouth daily as needed for Pain, Disp: 30 tablet, Rfl: 0    Past Surgical History:   Procedure Laterality Date    TUBAL LIGATION  1996       Patient Active Problem List   Diagnosis    Frequent UTI    Palpitations     Tobacco:  reports that she has never smoked. She has never used smokeless tobacco.  Alcohol:   Social

## 2024-12-17 ENCOUNTER — OFFICE VISIT (OUTPATIENT)
Age: 49
End: 2024-12-17
Payer: COMMERCIAL

## 2024-12-17 DIAGNOSIS — M54.16 LUMBAR RADICULOPATHY: Primary | ICD-10-CM

## 2024-12-17 DIAGNOSIS — M51.362 DEGENERATION OF INTERVERTEBRAL DISC OF LUMBAR REGION WITH DISCOGENIC BACK PAIN AND LOWER EXTREMITY PAIN: ICD-10-CM

## 2024-12-17 DIAGNOSIS — M46.1 SACROILIITIS (HCC): ICD-10-CM

## 2024-12-17 PROCEDURE — 99214 OFFICE O/P EST MOD 30 MIN: CPT | Performed by: NURSE PRACTITIONER

## 2024-12-17 PROCEDURE — G8428 CUR MEDS NOT DOCUMENT: HCPCS | Performed by: NURSE PRACTITIONER

## 2024-12-17 PROCEDURE — 1036F TOBACCO NON-USER: CPT | Performed by: NURSE PRACTITIONER

## 2024-12-17 PROCEDURE — 3017F COLORECTAL CA SCREEN DOC REV: CPT | Performed by: NURSE PRACTITIONER

## 2024-12-17 PROCEDURE — G8484 FLU IMMUNIZE NO ADMIN: HCPCS | Performed by: NURSE PRACTITIONER

## 2024-12-17 PROCEDURE — G8417 CALC BMI ABV UP PARAM F/U: HCPCS | Performed by: NURSE PRACTITIONER

## 2024-12-17 RX ORDER — MELOXICAM 15 MG/1
15 TABLET ORAL DAILY PRN
Qty: 30 TABLET | Refills: 0 | Status: SHIPPED | OUTPATIENT
Start: 2024-12-17

## 2024-12-17 RX ORDER — CYCLOBENZAPRINE HCL 10 MG
10 TABLET ORAL 3 TIMES DAILY PRN
Qty: 60 TABLET | Refills: 0 | Status: SHIPPED | OUTPATIENT
Start: 2024-12-17 | End: 2025-01-06

## 2024-12-17 NOTE — PROGRESS NOTES
Name: Summer Haley  YOB: 1975  Gender: female  MRN: [id number]    CC: Follow-up low back pain, bilateral hip pain, left leg pain    HPI: This is a 49 y.o. year old female who presents to me with a long standing history of pain in the lower back rating into both legs but primarily the left posterior buttock and lateral leg to her foot.  She occasionally have bilateral groin pain.  She did find a steroid pack beneficial.  She was taking 800 mg of ibuprofen at night.  I changed her to Flexeril and meloxicam which she has found more helpful.  Patient was also given a home exercise program that she has completed under my care.  She has had moderate improvement.  X-rays revealed mild facet arthropathy at L5-S1 and mild to moderate degenerative disc disease at that level.    Thus far, the patient has tried NSAIDS, muscle relaxers, and physician directed home exercise program.    Patient has completed a home exercise program under my care for 6 weeks, from 11/19/24 to 12/17/24  performing exercises 5-6 times a week.These exercises included: Pelvic tilt, cat and camel, single knee-to-chest, double knee-to-chest, lower trunk rotation, pelvic bridging, prone on elbows, prone on extended arms, standing back extensions, side bend, prone upper extremity exercise, prone lower extremity exercise.                No data to display                     No Known Allergies    Current Outpatient Medications:     cyclobenzaprine (FLEXERIL) 10 MG tablet, Take 1 tablet by mouth 3 times daily as needed for Muscle spasms, Disp: 60 tablet, Rfl: 0    meloxicam (MOBIC) 15 MG tablet, Take 1 tablet by mouth daily as needed for Pain, Disp: 30 tablet, Rfl: 0    vitamin D (ERGOCALCIFEROL) 1.25 MG (79566 UT) CAPS capsule, Take 1 capsule by mouth Twice a Week, Disp: , Rfl:     atorvastatin (LIPITOR) 40 MG tablet, Take 1 tablet by mouth daily, Disp: , Rfl:     meloxicam (MOBIC) 15 MG tablet, Take 1 tablet by mouth daily as needed for

## 2025-01-17 ENCOUNTER — TRANSCRIBE ORDERS (OUTPATIENT)
Dept: SCHEDULING | Age: 50
End: 2025-01-17

## 2025-01-17 DIAGNOSIS — Z12.31 VISIT FOR SCREENING MAMMOGRAM: Primary | ICD-10-CM

## 2025-01-28 ENCOUNTER — OFFICE VISIT (OUTPATIENT)
Age: 50
End: 2025-01-28
Payer: COMMERCIAL

## 2025-01-28 DIAGNOSIS — M51.360 DEGENERATION OF INTERVERTEBRAL DISC OF LUMBAR REGION WITH DISCOGENIC BACK PAIN: Primary | ICD-10-CM

## 2025-01-28 DIAGNOSIS — M54.16 LUMBAR RADICULOPATHY: ICD-10-CM

## 2025-01-28 DIAGNOSIS — M48.061 LUMBAR FORAMINAL STENOSIS: ICD-10-CM

## 2025-01-28 PROCEDURE — 99214 OFFICE O/P EST MOD 30 MIN: CPT | Performed by: NURSE PRACTITIONER

## 2025-01-28 NOTE — PROGRESS NOTES
Name: Summer Haley  YOB: 1975  Gender: female  MRN: [id number]    CC: Follow-up low back pain bilateral hip pain left worse than right    HPI: This is a 49 y.o. year old female who presented with longstanding history of low back pain rating into both legs.  But mostly the left posterior lateral leg to her foot.  Occasionally she will have some groin pain.  She has tried steroids, 800 mg ibuprofen, Flexeril, meloxicam and a home exercise program.  X-rays revealed mild facet arthropathy and mild to moderate degenerative disc disease at L5-S1.  At last visit she was referred for an MRI of the lumbar spine.      Patient has completed a home exercise program under my care for 6 weeks, from 11/19/24 to 1/28/25  performing exercises 5-6 times a week.These exercises included: Pelvic tilt, cat and camel, single knee-to-chest, double knee-to-chest, lower trunk rotation, pelvic bridging, prone on elbows, prone on extended arms, standing back extensions, side bend, prone upper extremity exercise, prone lower extremity exercise.      Current pain level: 6-7/10  Activities limited by pain: Most activities, exertional activities              No data to display                     No Known Allergies    Current Outpatient Medications:     meloxicam (MOBIC) 15 MG tablet, Take 1 tablet by mouth daily as needed for Pain, Disp: 30 tablet, Rfl: 0    vitamin D (ERGOCALCIFEROL) 1.25 MG (52263 UT) CAPS capsule, Take 1 capsule by mouth Twice a Week, Disp: , Rfl:     atorvastatin (LIPITOR) 40 MG tablet, Take 1 tablet by mouth daily, Disp: , Rfl:     meloxicam (MOBIC) 15 MG tablet, Take 1 tablet by mouth daily as needed for Pain, Disp: 30 tablet, Rfl: 0  Past Medical History:   Diagnosis Date    Diabetes (HCC)     UTI (urinary tract infection)      Tobacco:  reports that she has never smoked. She has never used smokeless tobacco.  Alcohol:   Social History     Substance and Sexual Activity   Alcohol Use Yes    Alcohol/week:

## 2025-02-05 ENCOUNTER — OFFICE VISIT (OUTPATIENT)
Dept: ORTHOPEDIC SURGERY | Age: 50
End: 2025-02-05
Payer: COMMERCIAL

## 2025-02-05 DIAGNOSIS — M54.16 LUMBAR RADICULOPATHY: Primary | ICD-10-CM

## 2025-02-05 PROCEDURE — 64483 NJX AA&/STRD TFRM EPI L/S 1: CPT | Performed by: PHYSICAL MEDICINE & REHABILITATION

## 2025-02-05 RX ORDER — TRIAMCINOLONE ACETONIDE 40 MG/ML
40 INJECTION, SUSPENSION INTRA-ARTICULAR; INTRAMUSCULAR ONCE
Status: COMPLETED | OUTPATIENT
Start: 2025-02-05 | End: 2025-02-05

## 2025-02-05 RX ADMIN — TRIAMCINOLONE ACETONIDE 40 MG: 40 INJECTION, SUSPENSION INTRA-ARTICULAR; INTRAMUSCULAR at 10:04

## 2025-02-05 NOTE — PROGRESS NOTES
Date: 02/05/25   Name: Summer Haley    Pre-Procedural Diagnosis:    Diagnosis Orders   1. Lumbar radiculopathy  FL NERVE BLOCK LUMBOSACRAL 1ST    triamcinolone acetonide (KENALOG-40) injection 40 mg          Procedure: Bilateral Selective Nerve Root Blocks (Transforaminal) - Single Level    I have reviewed prior lumbar spine radiographs that reveal 5 non rib-bearing lumbar vertebrae., I have reviewed clinician's notes and orders placed., and I have personally reviewed with patient the informed consent for operation/procedure per [facility] protocol.  This involves risks and benefits of procedure, potential for success/improvement of injections,qualifications of physician performing procedure.  Consent form addressed appropriate local anesthesia, emergent blood transfusion, clarification of DNR status.  This form was signed by all appropriate parties and scanned into the medical record. Note that is not appropriate for me to discuss spine surgical issues or other treatment options if I am not the primary clinician.  If I am the ordering clinician, those issues would have been discussed at the appropriate office visit or at upcoming encounter.     Precautions: [facility] Precautions spine injections: None.  Patient denies any prior sensitivity to steroid, local anesthetic, contrast dye, iodine or shellfish.    The procedure was discussed at length with the patient and informed consent was signed. The patient was placed in a prone position on the fluoroscopy table and the skin was prepped and draped in a routine sterile fashion. The areas to be injected was/were anesthetized with approximately 5 cc of 1% Lidocaine. A 22-gauge 5 inch spinal needle was carefully advanced under fluoroscopic guidance to the bilateral L5 transforaminal spaces.  At this time 0.25 cc of omnipaque administered.  Once proper placement was confirmed, 2 cc of 0.25% Marcaine and 80 mg of Kenalog were injected through the spinal needle at each site.

## 2025-02-26 ENCOUNTER — OFFICE VISIT (OUTPATIENT)
Age: 50
End: 2025-02-26
Payer: COMMERCIAL

## 2025-02-26 ENCOUNTER — EVALUATION (OUTPATIENT)
Age: 50
End: 2025-02-26

## 2025-02-26 DIAGNOSIS — M54.16 LUMBAR RADICULOPATHY: ICD-10-CM

## 2025-02-26 DIAGNOSIS — M48.061 SPINAL STENOSIS OF LUMBAR REGION, UNSPECIFIED WHETHER NEUROGENIC CLAUDICATION PRESENT: ICD-10-CM

## 2025-02-26 DIAGNOSIS — M51.360 DEGENERATION OF INTERVERTEBRAL DISC OF LUMBAR REGION WITH DISCOGENIC BACK PAIN: Primary | ICD-10-CM

## 2025-02-26 DIAGNOSIS — G89.29 CHRONIC BILATERAL LOW BACK PAIN WITH BILATERAL SCIATICA: ICD-10-CM

## 2025-02-26 DIAGNOSIS — M54.41 CHRONIC BILATERAL LOW BACK PAIN WITH BILATERAL SCIATICA: ICD-10-CM

## 2025-02-26 DIAGNOSIS — M54.16 LUMBAR RADICULOPATHY: Primary | ICD-10-CM

## 2025-02-26 DIAGNOSIS — M48.061 LUMBAR FORAMINAL STENOSIS: ICD-10-CM

## 2025-02-26 DIAGNOSIS — M51.360 DEGENERATION OF INTERVERTEBRAL DISC OF LUMBAR REGION WITH DISCOGENIC BACK PAIN: ICD-10-CM

## 2025-02-26 DIAGNOSIS — M54.42 CHRONIC BILATERAL LOW BACK PAIN WITH BILATERAL SCIATICA: ICD-10-CM

## 2025-02-26 PROCEDURE — 99214 OFFICE O/P EST MOD 30 MIN: CPT | Performed by: NURSE PRACTITIONER

## 2025-02-26 RX ORDER — MELOXICAM 15 MG/1
15 TABLET ORAL DAILY PRN
Qty: 30 TABLET | Refills: 0 | Status: SHIPPED | OUTPATIENT
Start: 2025-02-26

## 2025-02-26 NOTE — PROGRESS NOTES
Name: Summer Haley  YOB: 1975  Gender: female  MRN: [id number]    CC: Follow-up low back pain, bilateral hip pain left worse than right    HPI: This is a 49 y.o. year old female who returns today after lumbar injections.     Patient is a CNA.  She presents to me longstanding history of back pain rating into both legs.  Rating primarily in the left posterior lateral leg to her foot.  She does have some occasional groin pain.  But MRI had revealed advanced foraminal stenosis bilaterally at L5-S1, significant degenerated disc at L5-S1 with Modic changes.  She was set up for bilateral L5 transforaminal JESSE's.  She reports about 60% relief with this injection.  Now her right actually is a little bit worse than her left.  She does find the meloxicam helpful.  She is getting ready to start physical therapy.    Percentage of pain relief: 60%              No data to display                    No Known Allergies    Current Outpatient Medications:     meloxicam (MOBIC) 15 MG tablet, Take 1 tablet by mouth daily as needed for Pain, Disp: 30 tablet, Rfl: 0    vitamin D (ERGOCALCIFEROL) 1.25 MG (95014 UT) CAPS capsule, Take 1 capsule by mouth Twice a Week, Disp: , Rfl:     atorvastatin (LIPITOR) 40 MG tablet, Take 1 tablet by mouth daily, Disp: , Rfl:   Past Medical History:   Diagnosis Date    Diabetes (HCC)     UTI (urinary tract infection)      Tobacco:  reports that she has never smoked. She has never used smokeless tobacco.  Alcohol:   Social History     Substance and Sexual Activity   Alcohol Use Yes    Alcohol/week: 2.0 standard drinks of alcohol          Radiographic Studies:       Assessment/Plan:        ICD-10-CM    1. Lumbar radiculopathy  M54.16       2. Lumbar foraminal stenosis  M48.061       3. Degeneration of intervertebral disc of lumbar region with discogenic back pain  M51.360            Patient has had moderate improvement with the injection.  We discussed that some of the back pain may be the

## 2025-02-26 NOTE — PROGRESS NOTES
[facility]  [address] [place] [id number]  Dept: [phone]      Physical Therapy Initial Assessment     Referring MD: Ryan John APRN*  Diagnosis:     ICD-10-CM    1. Degeneration of intervertebral disc of lumbar region with discogenic back pain  M51.360       2. Lumbar radiculopathy  M54.16       3. Spinal stenosis of lumbar region, unspecified whether neurogenic claudication present  M48.061       4. Chronic bilateral low back pain with bilateral sciatica  M54.42     M54.41     G89.29          Therapy precautions:None  Co-morbidities affecting plan of care: diabetes    Payor: Payor: [employer] /  /  /  Billing pattern: Commercial- substantial/midpoint time each CPT  Total Timed Procedure Codes: 10 min, Total Time: 40 min Modifier needed: No  Episode visit count:  1     PERTINENT MEDICAL HISTORY     Past medical and surgical history:   Past Medical History:   Diagnosis Date    Diabetes (HCC)     UTI (urinary tract infection)       Past Surgical History:   Procedure Laterality Date    TUBAL LIGATION  1996     Medications: reviewed in chart   Allergies: No Known Allergies     Diagnostic exams (per chart review): MRI lumbar spine impression 12/31/24:  Bilateral intraforaminal disc herniations, right greater than left, at the lumbosacral junction level. Other minor degenerative changes as  described.  MRI independently reviewed by SUBHASH John NP and reveals foraminal stenosis bilaterally at L5 right is worse than left.    SUBJECTIVE     Chief complaints/history of injury:    Date symptoms began: 2024  Nature of condition: Chronic (continuous duration > 3 months)  Primary cause of current episode: Repetitive  How did symptoms start: 1 year history of progressive back pain located posterior R/L hips (none in center) radiating into both legs but primarily in the left posterior lateral leg mainly to knees and sometimes into her foot. She does have

## 2025-03-06 ENCOUNTER — TREATMENT (OUTPATIENT)
Age: 50
End: 2025-03-06

## 2025-03-06 DIAGNOSIS — M54.41 CHRONIC BILATERAL LOW BACK PAIN WITH BILATERAL SCIATICA: ICD-10-CM

## 2025-03-06 DIAGNOSIS — M54.42 CHRONIC BILATERAL LOW BACK PAIN WITH BILATERAL SCIATICA: ICD-10-CM

## 2025-03-06 DIAGNOSIS — M54.16 LUMBAR RADICULOPATHY: ICD-10-CM

## 2025-03-06 DIAGNOSIS — M48.061 SPINAL STENOSIS OF LUMBAR REGION, UNSPECIFIED WHETHER NEUROGENIC CLAUDICATION PRESENT: ICD-10-CM

## 2025-03-06 DIAGNOSIS — G89.29 CHRONIC BILATERAL LOW BACK PAIN WITH BILATERAL SCIATICA: ICD-10-CM

## 2025-03-06 DIAGNOSIS — M51.360 DEGENERATION OF INTERVERTEBRAL DISC OF LUMBAR REGION WITH DISCOGENIC BACK PAIN: Primary | ICD-10-CM

## 2025-03-06 NOTE — PROGRESS NOTES
for proper form and technique.  Pt will increase lower abdominal strength to at least 2+/5 for improved spinal stabilization with functional activity.    Long term goals to be met by 4/09/2025  (42 days):  Patient will be compliant and independent with a comprehensive HEP and activity progression.  Pt will demonstrate full pain free trunk AROM in order to perform functional ADL's and work duties without difficulty.  Pt will increase lower abdominal strength to at least  3/5 for improved spinal stabilization with functional activity.  Pt will increase strength in B hips with MMT to at least 4+/5 for improved stability with daily and work activities.  Pt will demonstrate correct body mechanics for lifting and transfer tasks to prevent risk of exacerbation/re-injury.  Pt will maintain score on the Oswestry Low Back Pain Questionnaire to </= 20 % disability, demonstrating improved overall function.    [employer]  Access Code: [email or web address]  URL: [email or web address]  Date: 03/06/2025  Prepared by: Oma Jaimes    Exercises  - Hooklying SI Joint Self-Correction  - 1 sets - 5 reps - 8 hold  - Supine Transversus Abdominis Bracing - Hands on Ground  - 2 x daily - 2 sets - 10 reps - 5 hold  - Beginner Bridge  - 5 x weekly - 2-3 sets - 10 reps - 5 hold  - Prone Press Up On Elbows  - 3-5 x daily - 1 sets - 10 reps - 10 hold  - Prone Hip Extension - One Pillow  - 5 x weekly - 2 sets - 10 reps - 5 hold  - Clamshell  - 5 x weekly - 2 sets - 10 reps - 3 hold  - Standing Back Extension  - 3-5 x daily - 1 sets - 10 reps - 5 hold

## 2025-03-14 ENCOUNTER — TREATMENT (OUTPATIENT)
Age: 50
End: 2025-03-14

## 2025-03-14 DIAGNOSIS — M48.061 LUMBAR FORAMINAL STENOSIS: ICD-10-CM

## 2025-03-14 DIAGNOSIS — M54.42 CHRONIC BILATERAL LOW BACK PAIN WITH BILATERAL SCIATICA: ICD-10-CM

## 2025-03-14 DIAGNOSIS — M54.41 CHRONIC BILATERAL LOW BACK PAIN WITH BILATERAL SCIATICA: ICD-10-CM

## 2025-03-14 DIAGNOSIS — M54.16 LUMBAR RADICULOPATHY: ICD-10-CM

## 2025-03-14 DIAGNOSIS — M48.061 SPINAL STENOSIS OF LUMBAR REGION, UNSPECIFIED WHETHER NEUROGENIC CLAUDICATION PRESENT: ICD-10-CM

## 2025-03-14 DIAGNOSIS — M51.360 DEGENERATION OF INTERVERTEBRAL DISC OF LUMBAR REGION WITH DISCOGENIC BACK PAIN: Primary | ICD-10-CM

## 2025-03-14 DIAGNOSIS — G89.29 CHRONIC BILATERAL LOW BACK PAIN WITH BILATERAL SCIATICA: ICD-10-CM

## 2025-03-14 NOTE — PROGRESS NOTES
[facility] [place] [facility]  [address] [place] [id number]  Dept: [phone]      Physical Therapy Daily Note     Referring MD: Ryan John APRN*  Diagnosis:     ICD-10-CM    1. Degeneration of intervertebral disc of lumbar region with discogenic back pain  M51.360       2. Lumbar radiculopathy  M54.16       3. Spinal stenosis of lumbar region, unspecified whether neurogenic claudication present  M48.061       4. Chronic bilateral low back pain with bilateral sciatica  M54.42     M54.41     G89.29       5. Lumbar foraminal stenosis [M48.061]  M48.061          Therapy precautions:None  Co-morbidities affecting plan of care: diabetes  Chief complaints/history of injury:1 year history of progressive back pain located posterior R/L hips (none in center) radiating into both legs but primarily in the left posterior lateral leg mainly to knees and sometimes into her foot. She does have some occasional B groin pain. MRI had revealed advanced foraminal stenosis bilaterally at L5-S1, significant degenerated disc at L5-S1 with Modic changes. Previous treatment includes a steroid pack prescribed by PCP that was beneficial while taking it but pain returned once completed. She underwent bilateral L5 transforaminal JESSE's 2/5/25 and reports about 60% relief with this injection. Now her right side is a little bit worse than her left. Pain has improved from a constant sharp pain to an intermittent pain worsened with activity. She does find the meloxicam helpful. Pt will follow up with SUBHASH John NP in 6 weeks to consider repeat injections.   Patient Stated Goals: decrease pain, learn body mechanics    Payor: Payor: [employer] [facility] [employer] /  /  /  Billing pattern: Commercial- substantial/midpoint time each CPT   Total Timed Procedure Codes: 40 min, Total Time: 55 min Modifier needed: No  Episode visit count:  3     SUBJECTIVE     Pt reports that back is doing better this week. Pt does note

## 2025-03-20 ENCOUNTER — TREATMENT (OUTPATIENT)
Age: 50
End: 2025-03-20

## 2025-03-20 DIAGNOSIS — M51.360 DEGENERATION OF INTERVERTEBRAL DISC OF LUMBAR REGION WITH DISCOGENIC BACK PAIN: Primary | ICD-10-CM

## 2025-03-20 DIAGNOSIS — M54.41 CHRONIC BILATERAL LOW BACK PAIN WITH BILATERAL SCIATICA: ICD-10-CM

## 2025-03-20 DIAGNOSIS — M48.061 LUMBAR FORAMINAL STENOSIS: ICD-10-CM

## 2025-03-20 DIAGNOSIS — M54.42 CHRONIC BILATERAL LOW BACK PAIN WITH BILATERAL SCIATICA: ICD-10-CM

## 2025-03-20 DIAGNOSIS — M54.16 LUMBAR RADICULOPATHY: ICD-10-CM

## 2025-03-20 DIAGNOSIS — M48.061 SPINAL STENOSIS OF LUMBAR REGION, UNSPECIFIED WHETHER NEUROGENIC CLAUDICATION PRESENT: ICD-10-CM

## 2025-03-20 DIAGNOSIS — G89.29 CHRONIC BILATERAL LOW BACK PAIN WITH BILATERAL SCIATICA: ICD-10-CM

## 2025-03-20 NOTE — PROGRESS NOTES
functional ADL's and work duties without difficulty.  Pt will increase lower abdominal strength to at least  3/5 for improved spinal stabilization with functional activity.  Pt will increase strength in B hips with MMT to at least 4+/5 for improved stability with daily and work activities.  Pt will demonstrate correct body mechanics for lifting and transfer tasks to prevent risk of exacerbation/re-injury.  Pt will maintain score on the Oswestry Low Back Pain Questionnaire to </= 20 % disability, demonstrating improved overall function.    [employer]  Access Code: [email or web address]  URL: [email or web address]  Date: 03/20/2025  Prepared by: Oma Jaimes    Exercises  - Hooklying SI Joint Self-Correction  - 1 sets - 5 reps - 8 hold  - Supine Transversus Abdominis Bracing - Hands on Ground  - 2 x daily - 2 sets - 10 reps - 5 hold  - Beginner Bridge  - 5 x weekly - 2-3 sets - 10 reps - 5 hold  - Bent Knee Fallouts  - 1 x daily - 1-2 sets - 10 reps - 3 hold  - Prone Press Up On Elbows  - 3-5 x daily - 1 sets - 10 reps - 10 hold  - Prone Hip Extension - One Pillow  - 5 x weekly - 1 sets - 10 reps - 5 hold  - Prone Alternating Arm and Leg Lifts  - 5 x weekly - 1 sets - 10 reps - 5 hold  - Clamshell  - 5 x weekly - 2 sets - 10 reps - 3 hold  - Standing Back Extension  - 3-5 x daily - 1 sets - 10 reps - 5 hold

## 2025-03-26 ENCOUNTER — TREATMENT (OUTPATIENT)
Age: 50
End: 2025-03-26
Payer: COMMERCIAL

## 2025-03-26 DIAGNOSIS — M54.16 LUMBAR RADICULOPATHY: ICD-10-CM

## 2025-03-26 DIAGNOSIS — M51.360 DEGENERATION OF INTERVERTEBRAL DISC OF LUMBAR REGION WITH DISCOGENIC BACK PAIN: Primary | ICD-10-CM

## 2025-03-26 DIAGNOSIS — M54.42 CHRONIC BILATERAL LOW BACK PAIN WITH BILATERAL SCIATICA: ICD-10-CM

## 2025-03-26 DIAGNOSIS — G89.29 CHRONIC BILATERAL LOW BACK PAIN WITH BILATERAL SCIATICA: ICD-10-CM

## 2025-03-26 DIAGNOSIS — M48.061 SPINAL STENOSIS OF LUMBAR REGION, UNSPECIFIED WHETHER NEUROGENIC CLAUDICATION PRESENT: ICD-10-CM

## 2025-03-26 DIAGNOSIS — M54.41 CHRONIC BILATERAL LOW BACK PAIN WITH BILATERAL SCIATICA: ICD-10-CM

## 2025-03-26 DIAGNOSIS — M48.061 LUMBAR FORAMINAL STENOSIS: ICD-10-CM

## 2025-03-26 PROCEDURE — 97110 THERAPEUTIC EXERCISES: CPT | Performed by: PHYSICAL THERAPIST

## 2025-03-26 PROCEDURE — M5044 MISC THERA-BAND/TUBING: HCPCS | Performed by: PHYSICAL THERAPIST

## 2025-03-26 PROCEDURE — 97140 MANUAL THERAPY 1/> REGIONS: CPT | Performed by: PHYSICAL THERAPIST

## 2025-03-26 NOTE — PROGRESS NOTES
[facility] [place] [facility]  [address] [place] [id number]  Dept: [phone]      Physical Therapy Daily Note     Referring MD: Ryan John APRN*  Diagnosis:     ICD-10-CM    1. Degeneration of intervertebral disc of lumbar region with discogenic back pain  M51.360       2. Lumbar radiculopathy  M54.16       3. Spinal stenosis of lumbar region, unspecified whether neurogenic claudication present  M48.061       4. Chronic bilateral low back pain with bilateral sciatica  M54.42     M54.41     G89.29       5. Lumbar foraminal stenosis [M48.061]  M48.061          Therapy precautions:None  Co-morbidities affecting plan of care: diabetes  Chief complaints/history of injury:1 year history of progressive back pain located posterior R/L hips (none in center) radiating into both legs but primarily in the left posterior lateral leg mainly to knees and sometimes into her foot. She does have some occasional B groin pain. MRI had revealed advanced foraminal stenosis bilaterally at L5-S1, significant degenerated disc at L5-S1 with Modic changes. Previous treatment includes a steroid pack prescribed by PCP that was beneficial while taking it but pain returned once completed. She underwent bilateral L5 transforaminal JESSE's 2/5/25 and reports about 60% relief with this injection. Now her right side is a little bit worse than her left. Pain has improved from a constant sharp pain to an intermittent pain worsened with activity. She does find the meloxicam helpful. Pt will follow up with SUBHASH John NP in 6 weeks to consider repeat injections.   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

## 2025-04-08 ENCOUNTER — TREATMENT (OUTPATIENT)
Age: 50
End: 2025-04-08
Payer: COMMERCIAL

## 2025-04-08 DIAGNOSIS — M54.16 LUMBAR RADICULOPATHY: ICD-10-CM

## 2025-04-08 DIAGNOSIS — M48.061 SPINAL STENOSIS OF LUMBAR REGION, UNSPECIFIED WHETHER NEUROGENIC CLAUDICATION PRESENT: ICD-10-CM

## 2025-04-08 DIAGNOSIS — M51.360 DEGENERATION OF INTERVERTEBRAL DISC OF LUMBAR REGION WITH DISCOGENIC BACK PAIN: Primary | ICD-10-CM

## 2025-04-08 PROCEDURE — 97110 THERAPEUTIC EXERCISES: CPT | Performed by: PHYSICAL THERAPIST

## 2025-04-08 PROCEDURE — 97012 MECHANICAL TRACTION THERAPY: CPT | Performed by: PHYSICAL THERAPIST

## 2025-04-08 PROCEDURE — 97140 MANUAL THERAPY 1/> REGIONS: CPT | Performed by: PHYSICAL THERAPIST

## 2025-04-08 NOTE — PROGRESS NOTES
4/8/2025   Pt will demonstrate full pain free trunk AROM in order to perform functional ADL's and work duties without difficulty. Goal Met 4/8/2025   Pt will increase lower abdominal strength to at least  3/5 for improved spinal stabilization with functional activity. Goal Met 4/8/2025   Pt will increase strength in B hips with MMT to at least 4+/5 for improved stability with daily and work activities. Goal Met 4/8/2025   Pt will demonstrate correct body mechanics for lifting and transfer tasks to prevent risk of exacerbation/re-injury. Goal Met 4/8/2025   Pt will maintain score on the Oswestry Low Back Pain Questionnaire to </= 20 % disability, demonstrating improved overall function. Goal Met 4/8/2025     [employer]  Access Code: [email or web address]  URL: [email or web address]  Date: 04/08/2025  Prepared by: Oma Jaimes    Exercises  - Hooklying SI Joint Self-Correction  - 1 sets - 5 reps - 8 hold  - Beginner Bridge  - 5 x weekly - 2-3 sets - 10 reps - 5 hold  - Bent Knee Fallouts  - 1 x daily - 1-2 sets - 10 reps - 3 hold  - Prone Press Up On Elbows  - 3-5 x daily - 1 sets - 10 reps - 10 hold  - Prone Hip Extension - One Pillow  - 5 x weekly - 1 sets - 10 reps - 15 hold  - Bird Dog  - 5 x weekly - 2 sets - 5 reps - 5 hold  - Standing Back Extension  - 3-5 x daily - 1 sets - 10 reps - 5 hold

## 2025-04-09 ENCOUNTER — OFFICE VISIT (OUTPATIENT)
Age: 50
End: 2025-04-09
Payer: COMMERCIAL

## 2025-04-09 DIAGNOSIS — M54.16 LUMBAR RADICULOPATHY: Primary | ICD-10-CM

## 2025-04-09 DIAGNOSIS — M48.061 LUMBAR FORAMINAL STENOSIS: ICD-10-CM

## 2025-04-09 DIAGNOSIS — M51.360 DEGENERATION OF INTERVERTEBRAL DISC OF LUMBAR REGION WITH DISCOGENIC BACK PAIN: ICD-10-CM

## 2025-04-09 PROCEDURE — 99214 OFFICE O/P EST MOD 30 MIN: CPT | Performed by: NURSE PRACTITIONER

## 2025-04-09 NOTE — PROGRESS NOTES
Name: Summer Haley  YOB: 1975  Gender: female  MRN: [id number]    CC: Follow-up bilateral buttock pain/hip pain    HPI: This is a 50 y.o. year old female who presented to me with longstanding history of back pain rating to both legs.  Pain was primarily in the left posterior leg to her foot.  MRI revealed advanced foraminal stenosis bilaterally at L5-S1 with degenerated disc at L5-S1 and Modic changes.  Her greatest complaint is pain in both buttocks.  She underwent bilateral L5 transforaminal JESSE's back in February with about 60% relief.  She did find meloxicam helpful.  She also wanted to complete her formal physical therapy regimen.  Patient has completed formal physical therapy.  She still has pain.  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    Thus far, the patient has tried NSAIDS, spine injections , and physical therapy 2 months.    Current pain level: 6-7/10  Activities limited by pain: Increased activity              No data to display                     No Known Allergies    Current Outpatient Medications:     meloxicam (MOBIC) 15 MG tablet, Take 1 tablet by mouth daily as needed for Pain, Disp: 30 tablet, Rfl: 0    vitamin D (ERGOCALCIFEROL) 1.25 MG (11657 UT) CAPS capsule, Take 1 capsule by mouth Twice a Week, Disp: , Rfl:     atorvastatin (LIPITOR) 40 MG tablet, Take 1 tablet by mouth daily, Disp: , Rfl:   Past Medical History:   Diagnosis Date    Diabetes (HCC)     UTI (urinary tract infection)      Tobacco:  reports that she has never smoked. She has never used smokeless tobacco.  Alcohol:   Social History     Substance and Sexual Activity   Alcohol Use Yes    Alcohol/week: 2.0 standard drinks of alcohol        Pertinent Physical Exam:           Radiographic Studies:       MRI Results (most recent):  MRI Result (most recent):  MRI LUMBAR SPINE WO CONTRAST 12/31/2024    Narrative  EXAM: MRI lumbar spine without contrast    HISTORY: Radiculopathy, lumbar

## 2025-04-23 ENCOUNTER — OFFICE VISIT (OUTPATIENT)
Dept: ORTHOPEDIC SURGERY | Age: 50
End: 2025-04-23
Payer: COMMERCIAL

## 2025-04-23 DIAGNOSIS — M54.16 LUMBAR RADICULOPATHY: Primary | ICD-10-CM

## 2025-04-23 PROCEDURE — 64483 NJX AA&/STRD TFRM EPI L/S 1: CPT | Performed by: PHYSICAL MEDICINE & REHABILITATION

## 2025-04-23 RX ORDER — TRIAMCINOLONE ACETONIDE 40 MG/ML
40 INJECTION, SUSPENSION INTRA-ARTICULAR; INTRAMUSCULAR ONCE
Status: COMPLETED | OUTPATIENT
Start: 2025-04-23 | End: 2025-04-23

## 2025-04-23 RX ADMIN — TRIAMCINOLONE ACETONIDE 40 MG: 40 INJECTION, SUSPENSION INTRA-ARTICULAR; INTRAMUSCULAR at 09:54

## 2025-04-23 NOTE — PROGRESS NOTES
Date: 04/23/25   Name: Summer Haley    Pre-Procedural Diagnosis:    Diagnosis Orders   1. Lumbar radiculopathy  FL NERVE BLOCK LUMBOSACRAL 1ST    triamcinolone acetonide (KENALOG-40) injection 40 mg          Procedure: Bilateral Selective Nerve Root Blocks (Transforaminal) - Single Level    I have reviewed prior lumbar spine radiographs that reveal 5 non rib-bearing lumbar vertebrae., I have reviewed clinician's notes and orders placed., and I have personally reviewed with patient the informed consent for operation/procedure per [facility] protocol.  This involves risks and benefits of procedure, potential for success/improvement of injections,qualifications of physician performing procedure.  Consent form addressed appropriate local anesthesia.  This form was signed by all appropriate parties and scanned into the medical record. Note that is not appropriate for me to discuss spine surgical issues or other treatment options if I am not the primary clinician.  If I am the ordering clinician, those issues would have been discussed at the appropriate office visit or at upcoming encounter.     Precautions: [facility] Precautions spine injections: None.  Patient denies any prior sensitivity to steroid, local anesthetic, contrast dye, iodine or shellfish.    The procedure was discussed at length with the patient and informed consent was signed. The patient was placed in a prone position on the fluoroscopy table and the skin was prepped and draped in a routine sterile fashion. The areas to be injected was/were anesthetized with approximately 5 cc of 1% Lidocaine. A 22-gauge 5 inch spinal needle was carefully advanced under fluoroscopic guidance to the bilateral L5 transforaminal spaces.  At this time 0.25 cc of omnipaque administered.  Once proper placement was confirmed, 2 cc of 0.25% Marcaine and 80 mg of Kenalog were injected through the spinal needle at each site.     Fluoroscopic guidance was used intermittently over a

## 2025-06-09 ENCOUNTER — OFFICE VISIT (OUTPATIENT)
Age: 50
End: 2025-06-09
Payer: COMMERCIAL

## 2025-06-09 DIAGNOSIS — M51.360 DEGENERATION OF INTERVERTEBRAL DISC OF LUMBAR REGION WITH DISCOGENIC BACK PAIN: Primary | ICD-10-CM

## 2025-06-09 PROCEDURE — 99214 OFFICE O/P EST MOD 30 MIN: CPT | Performed by: NURSE PRACTITIONER

## 2025-06-09 RX ORDER — MELOXICAM 15 MG/1
15 TABLET ORAL DAILY PRN
Qty: 30 TABLET | Refills: 0 | Status: SHIPPED | OUTPATIENT
Start: 2025-06-09

## 2025-06-09 NOTE — PROGRESS NOTES
Name: Summer Haley  YOB: 1975  Gender: female  MRN: [id number]    CC: Follow-up low back pain, bilateral hip pain    HPI: This is a 50 y.o. year old female who returns today after lumbar injections.     Patient has known degenerative disc disease at L5-S1 with Modic changes.  Greatest complaint was low back and buttock pain.  She does have some degree of mild foraminal narrowing.  Patient underwent her second series of bilateral L5 transforaminal JESSE's which she reports 90% relief.  She is completed formal physical therapy.  She does utilize meloxicam as needed.    Percentage of pain relief: 90%              No data to display                    No Known Allergies    Current Outpatient Medications:     meloxicam (MOBIC) 15 MG tablet, Take 1 tablet by mouth daily as needed for Pain, Disp: 30 tablet, Rfl: 0    meloxicam (MOBIC) 15 MG tablet, Take 1 tablet by mouth daily as needed for Pain, Disp: 30 tablet, Rfl: 0    vitamin D (ERGOCALCIFEROL) 1.25 MG (79751 UT) CAPS capsule, Take 1 capsule by mouth Twice a Week, Disp: , Rfl:     atorvastatin (LIPITOR) 40 MG tablet, Take 1 tablet by mouth daily, Disp: , Rfl:   Past Medical History:   Diagnosis Date    Diabetes (HCC)     UTI (urinary tract infection)      Tobacco:  reports that she has never smoked. She has never used smokeless tobacco.  Alcohol:   Social History     Substance and Sexual Activity   Alcohol Use Yes    Alcohol/week: 2.0 standard drinks of alcohol          Radiographic Studies:       Assessment/Plan:        ICD-10-CM    1. Degeneration of intervertebral disc of lumbar region with discogenic back pain  M51.360            90% relief with bilateral L5 transforaminal JESSE's.  Patient's MRI reviewed again and she does have Modic changes and a degenerated disc at L5-S1 with some degree of foraminal narrowing.  Greatest complaint is back pain.  Patient may be a great candidate for a lumbar disc replacement at L5-S1.  I briefly discussed this with

## 2025-07-29 LAB
CHOLEST SERPL-MCNC: 241 MG/DL (ref 0–200)
GLUCOSE SERPL-MCNC: 101 MG/DL (ref 70–99)
HDLC SERPL-MCNC: 58 MG/DL (ref 40–60)
LDLC SERPL CALC-MCNC: 126 MG/DL (ref 0–100)
TRIGL SERPL-MCNC: 287 MG/DL (ref 0–150)